# Patient Record
Sex: MALE | Race: WHITE | Employment: OTHER | URBAN - METROPOLITAN AREA
[De-identification: names, ages, dates, MRNs, and addresses within clinical notes are randomized per-mention and may not be internally consistent; named-entity substitution may affect disease eponyms.]

---

## 2020-05-11 ENCOUNTER — HOSPITAL ENCOUNTER (EMERGENCY)
Age: 70
Discharge: HOME | End: 2020-05-11
Attending: EMERGENCY MEDICINE
Payer: MEDICARE

## 2020-05-11 ENCOUNTER — APPOINTMENT (OUTPATIENT)
Dept: CT IMAGING | Age: 70
End: 2020-05-11
Payer: MEDICARE

## 2020-05-11 ENCOUNTER — APPOINTMENT (OUTPATIENT)
Dept: GENERAL RADIOLOGY | Age: 70
End: 2020-05-11
Payer: MEDICARE

## 2020-05-11 VITALS
SYSTOLIC BLOOD PRESSURE: 135 MMHG | HEART RATE: 101 BPM | DIASTOLIC BLOOD PRESSURE: 83 MMHG | HEIGHT: 68 IN | BODY MASS INDEX: 25.76 KG/M2 | WEIGHT: 170 LBS | TEMPERATURE: 98.9 F | RESPIRATION RATE: 18 BRPM | OXYGEN SATURATION: 93 %

## 2020-05-11 LAB
ADENOVIRUS BY PCR: NOT DETECTED
ALBUMIN SERPL-MCNC: 4.1 G/DL (ref 3.5–5.2)
ALP BLD-CCNC: 123 U/L (ref 40–129)
ALT SERPL-CCNC: 24 U/L (ref 0–40)
ANION GAP SERPL CALCULATED.3IONS-SCNC: 15 MMOL/L (ref 7–16)
APTT: 31.1 SEC (ref 24.5–35.1)
AST SERPL-CCNC: 21 U/L (ref 0–39)
BASOPHILS ABSOLUTE: 0.04 E9/L (ref 0–0.2)
BASOPHILS RELATIVE PERCENT: 0.3 % (ref 0–2)
BILIRUB SERPL-MCNC: 0.5 MG/DL (ref 0–1.2)
BORDETELLA PARAPERTUSSIS BY PCR: NOT DETECTED
BORDETELLA PERTUSSIS BY PCR: NOT DETECTED
BUN BLDV-MCNC: 20 MG/DL (ref 8–23)
CALCIUM SERPL-MCNC: 9.7 MG/DL (ref 8.6–10.2)
CHLAMYDOPHILIA PNEUMONIAE BY PCR: NOT DETECTED
CHLORIDE BLD-SCNC: 97 MMOL/L (ref 98–107)
CO2: 24 MMOL/L (ref 22–29)
CORONAVIRUS 229E BY PCR: NOT DETECTED
CORONAVIRUS HKU1 BY PCR: NOT DETECTED
CORONAVIRUS NL63 BY PCR: NOT DETECTED
CORONAVIRUS OC43 BY PCR: NOT DETECTED
CREAT SERPL-MCNC: 1.2 MG/DL (ref 0.7–1.2)
D DIMER: 386 NG/ML DDU
EKG ATRIAL RATE: 127 BPM
EKG P AXIS: 57 DEGREES
EKG P-R INTERVAL: 148 MS
EKG Q-T INTERVAL: 300 MS
EKG QRS DURATION: 84 MS
EKG QTC CALCULATION (BAZETT): 436 MS
EKG R AXIS: 50 DEGREES
EKG T AXIS: 75 DEGREES
EKG VENTRICULAR RATE: 127 BPM
EOSINOPHILS ABSOLUTE: 0.05 E9/L (ref 0.05–0.5)
EOSINOPHILS RELATIVE PERCENT: 0.4 % (ref 0–6)
GFR AFRICAN AMERICAN: >60
GFR NON-AFRICAN AMERICAN: 60 ML/MIN/1.73
GLUCOSE BLD-MCNC: 189 MG/DL (ref 74–99)
HCT VFR BLD CALC: 45.7 % (ref 37–54)
HEMOGLOBIN: 15.4 G/DL (ref 12.5–16.5)
HUMAN METAPNEUMOVIRUS BY PCR: NOT DETECTED
HUMAN RHINOVIRUS/ENTEROVIRUS BY PCR: NOT DETECTED
IMMATURE GRANULOCYTES #: 0.06 E9/L
IMMATURE GRANULOCYTES %: 0.5 % (ref 0–5)
INFLUENZA A BY PCR: NOT DETECTED
INFLUENZA B BY PCR: NOT DETECTED
INR BLD: 1.1
LACTIC ACID, SEPSIS: 1.2 MMOL/L (ref 0.5–1.9)
LYMPHOCYTES ABSOLUTE: 1.34 E9/L (ref 1.5–4)
LYMPHOCYTES RELATIVE PERCENT: 10.3 % (ref 20–42)
MCH RBC QN AUTO: 29 PG (ref 26–35)
MCHC RBC AUTO-ENTMCNC: 33.7 % (ref 32–34.5)
MCV RBC AUTO: 86.1 FL (ref 80–99.9)
MONOCYTES ABSOLUTE: 1.27 E9/L (ref 0.1–0.95)
MONOCYTES RELATIVE PERCENT: 9.7 % (ref 2–12)
MYCOPLASMA PNEUMONIAE BY PCR: NOT DETECTED
NEUTROPHILS ABSOLUTE: 10.29 E9/L (ref 1.8–7.3)
NEUTROPHILS RELATIVE PERCENT: 78.8 % (ref 43–80)
PARAINFLUENZA VIRUS 1 BY PCR: NOT DETECTED
PARAINFLUENZA VIRUS 2 BY PCR: NOT DETECTED
PARAINFLUENZA VIRUS 3 BY PCR: NOT DETECTED
PARAINFLUENZA VIRUS 4 BY PCR: NOT DETECTED
PDW BLD-RTO: 14.3 FL (ref 11.5–15)
PLATELET # BLD: 308 E9/L (ref 130–450)
PMV BLD AUTO: 9.9 FL (ref 7–12)
POTASSIUM REFLEX MAGNESIUM: 4.2 MMOL/L (ref 3.5–5)
PROTHROMBIN TIME: 12.9 SEC (ref 9.3–12.4)
RBC # BLD: 5.31 E12/L (ref 3.8–5.8)
RESPIRATORY SYNCYTIAL VIRUS BY PCR: NOT DETECTED
SARS-COV-2, NAAT: NOT DETECTED
SODIUM BLD-SCNC: 136 MMOL/L (ref 132–146)
TOTAL PROTEIN: 8.2 G/DL (ref 6.4–8.3)
TROPONIN: <0.01 NG/ML (ref 0–0.03)
WBC # BLD: 13.1 E9/L (ref 4.5–11.5)

## 2020-05-11 PROCEDURE — 6360000002 HC RX W HCPCS: Performed by: PHYSICIAN ASSISTANT

## 2020-05-11 PROCEDURE — 85610 PROTHROMBIN TIME: CPT

## 2020-05-11 PROCEDURE — 85025 COMPLETE CBC W/AUTO DIFF WBC: CPT

## 2020-05-11 PROCEDURE — 96374 THER/PROPH/DIAG INJ IV PUSH: CPT

## 2020-05-11 PROCEDURE — 2580000003 HC RX 258: Performed by: PHYSICIAN ASSISTANT

## 2020-05-11 PROCEDURE — 6360000004 HC RX CONTRAST MEDICATION: Performed by: RADIOLOGY

## 2020-05-11 PROCEDURE — 85378 FIBRIN DEGRADE SEMIQUANT: CPT

## 2020-05-11 PROCEDURE — 84484 ASSAY OF TROPONIN QUANT: CPT

## 2020-05-11 PROCEDURE — 0100U HC RESPIRPTHGN MULT REV TRANS & AMP PRB TECH 21 TRGT: CPT

## 2020-05-11 PROCEDURE — 93010 ELECTROCARDIOGRAM REPORT: CPT | Performed by: INTERNAL MEDICINE

## 2020-05-11 PROCEDURE — 83605 ASSAY OF LACTIC ACID: CPT

## 2020-05-11 PROCEDURE — 99285 EMERGENCY DEPT VISIT HI MDM: CPT

## 2020-05-11 PROCEDURE — 93005 ELECTROCARDIOGRAM TRACING: CPT | Performed by: PHYSICIAN ASSISTANT

## 2020-05-11 PROCEDURE — 87040 BLOOD CULTURE FOR BACTERIA: CPT

## 2020-05-11 PROCEDURE — U0002 COVID-19 LAB TEST NON-CDC: HCPCS

## 2020-05-11 PROCEDURE — 85730 THROMBOPLASTIN TIME PARTIAL: CPT

## 2020-05-11 PROCEDURE — 71045 X-RAY EXAM CHEST 1 VIEW: CPT

## 2020-05-11 PROCEDURE — 80053 COMPREHEN METABOLIC PANEL: CPT

## 2020-05-11 PROCEDURE — 6370000000 HC RX 637 (ALT 250 FOR IP): Performed by: PHYSICIAN ASSISTANT

## 2020-05-11 PROCEDURE — 71250 CT THORAX DX C-: CPT

## 2020-05-11 PROCEDURE — 71275 CT ANGIOGRAPHY CHEST: CPT

## 2020-05-11 RX ORDER — ALBUTEROL SULFATE 90 UG/1
2 AEROSOL, METERED RESPIRATORY (INHALATION) ONCE
Status: DISCONTINUED | OUTPATIENT
Start: 2020-05-11 | End: 2020-05-11 | Stop reason: HOSPADM

## 2020-05-11 RX ORDER — 0.9 % SODIUM CHLORIDE 0.9 %
1000 INTRAVENOUS SOLUTION INTRAVENOUS ONCE
Status: COMPLETED | OUTPATIENT
Start: 2020-05-11 | End: 2020-05-11

## 2020-05-11 RX ORDER — PREDNISONE 10 MG/1
40 TABLET ORAL DAILY
Qty: 20 TABLET | Refills: 0 | Status: SHIPPED | OUTPATIENT
Start: 2020-05-11 | End: 2020-05-16

## 2020-05-11 RX ORDER — DOXYCYCLINE HYCLATE 100 MG/1
100 CAPSULE ORAL ONCE
Status: COMPLETED | OUTPATIENT
Start: 2020-05-11 | End: 2020-05-11

## 2020-05-11 RX ORDER — GUAIFENESIN AND CODEINE PHOSPHATE 100; 10 MG/5ML; MG/5ML
5 SOLUTION ORAL 3 TIMES DAILY PRN
Qty: 45 ML | Refills: 0 | Status: SHIPPED | OUTPATIENT
Start: 2020-05-11 | End: 2020-05-14

## 2020-05-11 RX ORDER — ACETAMINOPHEN 325 MG/1
650 TABLET ORAL ONCE
Status: COMPLETED | OUTPATIENT
Start: 2020-05-11 | End: 2020-05-11

## 2020-05-11 RX ORDER — DOXYCYCLINE HYCLATE 100 MG
100 TABLET ORAL 2 TIMES DAILY
Qty: 20 TABLET | Refills: 0 | Status: SHIPPED | OUTPATIENT
Start: 2020-05-11 | End: 2020-05-21

## 2020-05-11 RX ORDER — METHYLPREDNISOLONE SODIUM SUCCINATE 125 MG/2ML
80 INJECTION, POWDER, LYOPHILIZED, FOR SOLUTION INTRAMUSCULAR; INTRAVENOUS ONCE
Status: COMPLETED | OUTPATIENT
Start: 2020-05-11 | End: 2020-05-11

## 2020-05-11 RX ORDER — SODIUM CHLORIDE 0.9 % (FLUSH) 0.9 %
10 SYRINGE (ML) INJECTION ONCE
Status: DISCONTINUED | OUTPATIENT
Start: 2020-05-11 | End: 2020-05-11 | Stop reason: HOSPADM

## 2020-05-11 RX ORDER — ALBUTEROL SULFATE 2.5 MG/3ML
2.5 SOLUTION RESPIRATORY (INHALATION) EVERY 6 HOURS PRN
Qty: 120 EACH | Refills: 3 | Status: SHIPPED | OUTPATIENT
Start: 2020-05-11

## 2020-05-11 RX ADMIN — IOPAMIDOL 70 ML: 755 INJECTION, SOLUTION INTRAVENOUS at 04:07

## 2020-05-11 RX ADMIN — ACETAMINOPHEN 650 MG: 325 TABLET ORAL at 01:11

## 2020-05-11 RX ADMIN — METHYLPREDNISOLONE SODIUM SUCCINATE 80 MG: 125 INJECTION, POWDER, FOR SOLUTION INTRAMUSCULAR; INTRAVENOUS at 04:29

## 2020-05-11 RX ADMIN — SODIUM CHLORIDE 1000 ML: 9 INJECTION, SOLUTION INTRAVENOUS at 01:05

## 2020-05-11 RX ADMIN — DOXYCYCLINE HYCLATE 100 MG: 100 CAPSULE ORAL at 04:29

## 2020-05-11 ASSESSMENT — PAIN SCALES - GENERAL
PAINLEVEL_OUTOF10: 0
PAINLEVEL_OUTOF10: 0

## 2020-05-11 NOTE — ED PROVIDER NOTES
QRS Duration 84 ms    Q-T Interval 300 ms    QTc Calculation (Bazett) 436 ms    P Axis 57 degrees    R Axis 50 degrees    T Axis 75 degrees     Imaging: All Radiology results interpreted by Radiologist unless otherwise noted. CTA PULMONARY W CONTRAST   Final Result   No PE or aortic dissection. Mild airways disease and atelectasis are noted. No evidence of pneumonia or pulmonary edema. This report has been electronically signed by Tamara Matias MD.      XR CHEST 1 VW   Final Result   No acute cardiopulmonary process. CT Chest WO Contrast   Final Result   1. There is no pattern for emphysematous changes in the lung   parenchyma. 2. Cannot exclude a component of discrete bronchitis/bronchiolitis to   mid/distal subsegmental branches of the left lower lobe and more   discretely in the right lower lobe and lingula. 3. Discrete areas of peripheral increased density superiorly spaces in   the posterior medial aspect of the left lower lobe and more discrete   in the right lower lobe are of undetermined age. They can represent   residual scar/peripheral atelectasis, although discrete/early   developing pneumonic process cannot be entirely excluded in the   absence of previous studies for comparison. Can consider follow-up   study. 4. Discrete nodular component seen in the lingula could also be   manifestation of distal airway disease as seen with more distal   bronchiolitis. EKG #1:  Interpreted by emergency department physician unless otherwise noted. Time:  0058    Rate: 127  Rhythm: Sinus. Interpretation: sinus tachycardia.     ED Course / Medical Decision Making     Medications   0.9 % sodium chloride bolus (0 mLs Intravenous Stopped 5/11/20 0148)   acetaminophen (TYLENOL) tablet 650 mg (650 mg Oral Given 5/11/20 0111)   iopamidol (ISOVUE-370) 76 % injection 70 mL (70 mLs Intravenous Given 5/11/20 0407)   methylPREDNISolone sodium (SOLU-MEDROL) injection 80 mg (80 mg Intravenous Given 5/11/20 0429)   doxycycline hyclate (VIBRAMYCIN) capsule 100 mg (100 mg Oral Given 5/11/20 0429)        Re-Evaluations:  5/11/20      Time: 0130  Pt is comfortable, oxygen 96% on 2 L nc. Pt ambulated 91% on no oxygen. Consultations:             None    Procedures:   none    MDM:  Pt presents from home with increasing shortness of breath and now developing fevers, reporting he feels like he has the flu, with diffuse body aches, no focal chest pain. Sinus tachy in ED with diminished breath sounds and diffuse wheezing noted. CT chest reports bronchiolitis, no ground glass opacities or focal lobar pna. Pt temp[erature reduced with tylenol. Cta negative for PE. Internal medicine contacted and felt pt could be discharged if he was comfortable with that, Dr. Mickey Black discussed with patient and this was his preference. Pt was stable at discharge, home with steroids, doxycycline, albuterol, advised to return if worsening. Counseling:   I have spoken with the patient and discussed todays results, in addition to providing specific details for the plan of care and counseling regarding the diagnosis and prognosis and are agreeable with the plan. Assessment      1. Upper respiratory tract infection, unspecified type    2. Acute bronchitis, unspecified organism      This patient's ED course included: a personal history and physicial examination, multiple bedside re-evaluations, IV medications, cardiac monitoring and continuous pulse oximetry  This patient has remained hemodynamically stable and improved during their ED course. Plan   Discharge to home. Patient condition is stable.     New Medications     Discharge Medication List as of 5/11/2020  4:27 AM      START taking these medications    Details   predniSONE (DELTASONE) 10 MG tablet Take 4 tablets by mouth daily for 5 days, Disp-20 tablet, R-0Print      doxycycline hyclate (VIBRA-TABS) 100 MG tablet Take 1 tablet by mouth 2 times daily for 10

## 2020-05-12 ENCOUNTER — CARE COORDINATION (OUTPATIENT)
Dept: CARE COORDINATION | Age: 70
End: 2020-05-12

## 2020-05-12 NOTE — CARE COORDINATION
tabletops, doorknobs, bathroom fixtures, toilets, phones, keyboards, tablets, and bedside tables. Also, clean any surfaces that may have blood, stool, or body fluids on them. Use a household cleaning spray or wipe, according to the label instructions. Labels contain instructions for safe and effective use of the cleaning product including precautions you should take when applying the product, such as wearing gloves and making sure you have good ventilation during use of the product. Monitor your symptoms  Seek prompt medical attention if your illness is worsening (e.g., difficulty breathing). Before seeking care, call your healthcare provider and tell them that you have, or are being evaluated for, COVID-19. Put on a facemask before you enter the facility. These steps will help the healthcare provider's office to keep other people in the office or waiting room from getting infected or exposed. Ask your healthcare provider to call the local or [facility] health department. Persons who are placed under If you have a medical emergency and need to call 911, notify the dispatch personnel that you have, or are being evaluated for COVID-19. If possible, put on a facemask before emergency medical services arrive. The patient agrees to contact the [facility] exposure line [phone], local health department [place] Department of Health: ([phone]) and PCP office for questions related to their healthcare. Author provided contact information for future reference. Patient/family/caregiver given information for [employer] and agrees to enroll no    Based on Loop alert triggers, patient will be contacted by nurse care manager for worsening symptoms.

## 2020-05-16 LAB
BLOOD CULTURE, ROUTINE: NORMAL
CULTURE, BLOOD 2: NORMAL

## 2020-05-26 ENCOUNTER — CARE COORDINATION (OUTPATIENT)
Dept: CARE COORDINATION | Age: 70
End: 2020-05-26

## 2020-05-26 NOTE — CARE COORDINATION
Patient contacted regarding COVID-19 risk and screening. This Km Sharif attempted to contact  the patient  for final follow up by telephone to perform follow-up call. Left message. Will no longer make and outreach.

## 2021-04-04 ENCOUNTER — HOSPITAL ENCOUNTER (EMERGENCY)
Age: 71
Discharge: HOME | End: 2021-04-04
Payer: MEDICARE

## 2021-04-04 ENCOUNTER — APPOINTMENT (OUTPATIENT)
Dept: GENERAL RADIOLOGY | Age: 71
End: 2021-04-04
Payer: MEDICARE

## 2021-04-04 VITALS
OXYGEN SATURATION: 94 % | RESPIRATION RATE: 16 BRPM | HEART RATE: 90 BPM | WEIGHT: 170 LBS | SYSTOLIC BLOOD PRESSURE: 116 MMHG | BODY MASS INDEX: 25.76 KG/M2 | HEIGHT: 68 IN | TEMPERATURE: 96.9 F | DIASTOLIC BLOOD PRESSURE: 70 MMHG

## 2021-04-04 DIAGNOSIS — M25.561 ACUTE PAIN OF RIGHT KNEE: Primary | ICD-10-CM

## 2021-04-04 LAB — URIC ACID, SERUM: 7.1 MG/DL (ref 3.4–7)

## 2021-04-04 PROCEDURE — 36415 COLL VENOUS BLD VENIPUNCTURE: CPT

## 2021-04-04 PROCEDURE — 99211 OFF/OP EST MAY X REQ PHY/QHP: CPT

## 2021-04-04 PROCEDURE — 84550 ASSAY OF BLOOD/URIC ACID: CPT

## 2021-04-04 PROCEDURE — 73560 X-RAY EXAM OF KNEE 1 OR 2: CPT

## 2021-04-04 RX ORDER — CEPHALEXIN 500 MG/1
500 CAPSULE ORAL 2 TIMES DAILY
Qty: 14 CAPSULE | Refills: 0 | Status: SHIPPED | OUTPATIENT
Start: 2021-04-04 | End: 2021-04-11

## 2021-04-04 ASSESSMENT — PAIN - FUNCTIONAL ASSESSMENT
PAIN_FUNCTIONAL_ASSESSMENT: PREVENTS OR INTERFERES SOME ACTIVE ACTIVITIES AND ADLS
PAIN_FUNCTIONAL_ASSESSMENT: PREVENTS OR INTERFERES SOME ACTIVE ACTIVITIES AND ADLS

## 2021-04-04 ASSESSMENT — PAIN DESCRIPTION - PAIN TYPE
TYPE: ACUTE PAIN
TYPE: ACUTE PAIN

## 2021-04-04 ASSESSMENT — PAIN DESCRIPTION - ONSET
ONSET: ON-GOING
ONSET: ON-GOING

## 2021-04-04 ASSESSMENT — PAIN DESCRIPTION - ORIENTATION: ORIENTATION: RIGHT

## 2021-04-04 ASSESSMENT — PAIN DESCRIPTION - LOCATION: LOCATION: KNEE

## 2021-04-04 ASSESSMENT — PAIN SCALES - GENERAL: PAINLEVEL_OUTOF10: 5

## 2021-04-04 ASSESSMENT — PAIN DESCRIPTION - PROGRESSION: CLINICAL_PROGRESSION: NOT CHANGED

## 2021-04-04 NOTE — ED PROVIDER NOTES
[facility]  Department of Emergency Medicine  UC Encounter Note  21   12:56 PM EDT      NAME: Maki Hernández  :  1950  MRN:  [id number]    Chief Complaint: Knee Pain (Right)      This is a 27-year-old male the presents to urgent care with family. He has been having some right anterior knee redness swelling and warmth for the past several days. He denies injury. Family states actually the knee was more swollen and red yesterday and has improved today. Has been taking some over-the-counter medications for this. He denies any chest pain or shortness of breath. I first contact patient he appears to be in no acute distress. Review of Systems  Pertinent positives and negatives are stated within HPI, all other systems reviewed and are negative. Physical Exam  Vitals signs and nursing note reviewed. Constitutional:       Appearance: He is well-developed. HENT:      Head: Normocephalic and atraumatic. Jaw: No trismus. Right Ear: Hearing, tympanic membrane, ear canal and external ear normal.      Left Ear: Hearing, tympanic membrane, ear canal and external ear normal.      Nose: Nose normal.      Right Sinus: No maxillary sinus tenderness or frontal sinus tenderness. Left Sinus: No maxillary sinus tenderness or frontal sinus tenderness. Mouth/Throat:      Pharynx: Uvula midline. No uvula swelling. Eyes:      General: Lids are normal.      Conjunctiva/sclera: Conjunctivae normal.      Pupils: Pupils are equal, round, and reactive to light. Neck:      Musculoskeletal: Normal range of motion and neck supple. Cardiovascular:      Rate and Rhythm: Normal rate and regular rhythm. Heart sounds: Normal heart sounds. No murmur. Pulmonary:      Effort: Pulmonary effort is normal.      Breath sounds: Normal breath sounds. Abdominal:      General: Bowel sounds are normal.      Palpations: Abdomen is soft. Abdomen is not rigid. Hyperlipidemia, and Hypertension. Past Surgical History:  has a past surgical history that includes doppler echocardiography. Social History:  reports that he has been smoking cigarettes. He has been smoking about 0.25 packs per day. He has never used smokeless tobacco. He reports current alcohol use. He reports that he does not use drugs. Family History: family history is not on file. The patients home medications have been reviewed. Allergies: Patient has no known allergies. -------------------------------------------------- RESULTS -------------------------------------------------  No results found for this visit on 04/04/21. XR KNEE RIGHT (1-2 VIEWS)   Final Result   No acute abnormality of the knee.             ------------------------- NURSING NOTES AND VITALS REVIEWED ---------------------------   The nursing notes within the ED encounter and vital signs as below have been reviewed. /70   Pulse 90   Temp 96.9 °F (36.1 °C) (Temporal)   Resp 16   Ht 5' 8\" (1.727 m)   Wt 170 lb (77.1 kg)   SpO2 94%   BMI 25.85 kg/m²   Oxygen Saturation Interpretation: Normal      ------------------------------------------ PROGRESS NOTES ------------------------------------------   I have spoken with the patient and discussed todays results, in addition to providing specific details for the plan of care and counseling regarding the diagnosis and prognosis. Their questions are answered at this time and they are agreeable with the plan.      --------------------------------- ADDITIONAL PROVIDER NOTES ---------------------------------     This patient is stable for discharge. I have shared the specific conditions for return, as well as the importance of follow-up. * NOTE: This report was transcribed using voice recognition software.  Every effort was made to ensure accuracy; however, inadvertent computerized transcription errors may be present.    --------------------------------- IMPRESSION AND DISPOSITION ---------------------------------    IMPRESSION  1.  Acute pain of right knee        DISPOSITION  Disposition: Discharge to home  Patient condition is good       Jasen Barron PA-C  04/04/21 [id number]

## 2022-05-24 ENCOUNTER — NURSE ONLY (OUTPATIENT)
Dept: NON INVASIVE DIAGNOSTICS | Age: 72
End: 2022-05-24

## 2022-05-24 VITALS — SYSTOLIC BLOOD PRESSURE: 128 MMHG | DIASTOLIC BLOOD PRESSURE: 62 MMHG

## 2022-05-24 DIAGNOSIS — I42.9 CARDIOMYOPATHY, UNSPECIFIED TYPE (HCC): Primary | ICD-10-CM

## 2022-05-24 NOTE — PROGRESS NOTES
History:  The patient is a gentleman with the following issues:    1. Hypertension and diabetes. 2. Severe left ventricular dysfunction with normal coronaries by cardiac catheterization in the [facility] in 2015.  3. Appropriate medical therapy with repeat MUGA scan in September 2015 revealing left ventricular ejection fraction of 28%. 4. History of alcohol use. 5. Sleep apnea possibly. The patient has been diagnosed but does not use his continuous positive airway pressure regularly. 6. Ex-smoker. 7. Mild renal insufficiency. 8. Repeat echocardiogram obtained on March 30th on appropriate medical therapy again shows a severely reduced left ventricular systolic function, left ventricular ejection fraction of 25% to 30%. 9. Implantation of a [employer], [place] [address]117 on 5-23-16. MEDICATIONS: Albuterol sulfate, Aleve, Aripiprazole 5 mg 1/2 tab QD, Aspirin 81 mg QD, Atorvastatin, CoQ10 200 mg QD, Furosemide 20 mg QD, Gabapentin, metformin, Metoprolol Tart 100 mg BID, Multivitamin, Omeprazole 20 mg QD, Tamsulosin, Venlafaxine 75 mg BID, Stiolto Respimat, Fish oil    Uses CPAP regularly**Vaccinated + Booster         Emblem S-ICD :    Battery:  30 % Good    Episodes:  No arrhythmias detected. Presenting rhythm strip shows sinus rhythm rate 67. ASSESSMENT:  Appropriate ICD function. Plan:    1.  SQ ICD follow-up in 6 months. 2.  Latitude transmission in 3 months. 3.  Pt is aware that continuous home monitoring is strongly recommended. Farshad Gonzalez M.D., F.A.C.C.

## 2023-05-22 ENCOUNTER — HOSPITAL ENCOUNTER (OUTPATIENT)
Age: 73
Discharge: HOME | End: 2023-05-22
Payer: MEDICARE

## 2023-05-22 LAB
BUN SERPL-MCNC: 20 MG/DL (ref 6–23)
CREAT SERPL-MCNC: 1 MG/DL (ref 0.7–1.2)

## 2023-05-22 PROCEDURE — 36415 COLL VENOUS BLD VENIPUNCTURE: CPT

## 2023-05-22 PROCEDURE — 82565 ASSAY OF CREATININE: CPT

## 2023-05-22 PROCEDURE — 84520 ASSAY OF UREA NITROGEN: CPT

## 2023-05-23 ENCOUNTER — HOSPITAL ENCOUNTER (OUTPATIENT)
Dept: CT IMAGING | Age: 73
Discharge: HOME | End: 2023-05-25
Payer: MEDICARE

## 2023-05-23 DIAGNOSIS — R05.3 CHRONIC COUGH: ICD-10-CM

## 2023-05-23 PROCEDURE — 6360000004 HC RX CONTRAST MEDICATION: Performed by: RADIOLOGY

## 2023-05-23 PROCEDURE — 71260 CT THORAX DX C+: CPT

## 2023-05-23 RX ADMIN — IOPAMIDOL 75 ML: 755 INJECTION, SOLUTION INTRAVENOUS at 15:53

## 2023-10-18 ENCOUNTER — HOSPITAL ENCOUNTER (OUTPATIENT)
Dept: CT IMAGING | Age: 73
Discharge: HOME | End: 2023-10-20
Payer: MEDICARE

## 2023-10-18 DIAGNOSIS — R91.8 OTHER NONSPECIFIC ABNORMAL FINDING OF LUNG FIELD: ICD-10-CM

## 2023-10-18 PROCEDURE — 71250 CT THORAX DX C-: CPT

## 2023-11-02 ENCOUNTER — OFFICE VISIT (OUTPATIENT)
Dept: NON INVASIVE DIAGNOSTICS | Age: 73
End: 2023-11-02

## 2023-11-02 ENCOUNTER — NURSE ONLY (OUTPATIENT)
Dept: NON INVASIVE DIAGNOSTICS | Age: 73
End: 2023-11-02

## 2023-11-02 VITALS
RESPIRATION RATE: 16 BRPM | DIASTOLIC BLOOD PRESSURE: 58 MMHG | BODY MASS INDEX: 25.01 KG/M2 | HEIGHT: 68 IN | WEIGHT: 165 LBS | HEART RATE: 83 BPM | SYSTOLIC BLOOD PRESSURE: 102 MMHG

## 2023-11-02 DIAGNOSIS — I51.9 HEART PROBLEM: Primary | ICD-10-CM

## 2023-11-02 DIAGNOSIS — I42.9 CARDIOMYOPATHY, UNSPECIFIED TYPE (HCC): Primary | ICD-10-CM

## 2023-11-02 DIAGNOSIS — E13.9 DIABETES 1.5, MANAGED AS TYPE 2 (HCC): ICD-10-CM

## 2023-11-02 DIAGNOSIS — Z95.810 PRESENCE OF AUTOMATIC CARDIOVERTER/DEFIBRILLATOR (AICD): ICD-10-CM

## 2023-11-02 PROBLEM — F43.12 POST-TRAUMATIC STRESS DISORDER, CHRONIC: Status: ACTIVE | Noted: 2023-11-02

## 2023-11-02 PROBLEM — N40.0 BENIGN PROSTATIC HYPERPLASIA: Status: ACTIVE | Noted: 2023-11-02

## 2023-11-02 PROBLEM — R06.09 CHRONIC DYSPNEA: Status: ACTIVE | Noted: 2023-04-17

## 2023-11-02 PROBLEM — F31.9 BIPOLAR DISORDER (HCC): Status: ACTIVE | Noted: 2018-04-13

## 2023-11-02 PROBLEM — J44.9 COPD (CHRONIC OBSTRUCTIVE PULMONARY DISEASE) (HCC): Status: ACTIVE | Noted: 2023-11-02

## 2023-11-02 PROBLEM — J02.9 ACUTE PHARYNGITIS: Status: ACTIVE | Noted: 2023-11-02

## 2023-11-02 PROBLEM — J44.1 ACUTE EXACERBATION OF CHRONIC OBSTRUCTIVE AIRWAYS DISEASE (HCC): Status: ACTIVE | Noted: 2019-02-23

## 2023-11-02 PROBLEM — F17.210 CIGARETTE SMOKER: Status: ACTIVE | Noted: 2023-04-17

## 2023-11-02 PROBLEM — E08.610 DIABETES MELLITUS DUE TO UNDERLYING CONDITION WITH DIABETIC NEUROPATHIC ARTHROPATHY (HCC): Status: ACTIVE | Noted: 2023-11-02

## 2023-11-02 PROBLEM — J44.9 CHRONIC OBSTRUCTIVE PULMONARY DISEASE, UNSPECIFIED (HCC): Status: ACTIVE | Noted: 2023-11-02

## 2023-11-02 PROBLEM — G47.36 HYPOXEMIA ASSOCIATED WITH SLEEP: Status: ACTIVE | Noted: 2023-04-17

## 2023-11-02 PROBLEM — J44.9 SEVERE CHRONIC OBSTRUCTIVE PULMONARY DISEASE (HCC): Status: ACTIVE | Noted: 2023-04-17

## 2023-11-02 PROBLEM — R05.3 CHRONIC COUGH: Status: ACTIVE | Noted: 2023-04-17

## 2023-11-02 PROBLEM — E11.40 DIABETIC NEUROPATHY (HCC): Status: ACTIVE | Noted: 2023-11-02

## 2023-11-02 PROBLEM — K57.30 DIVERTICULAR DISEASE OF COLON: Status: ACTIVE | Noted: 2023-11-02

## 2023-11-02 PROBLEM — G47.30 SLEEP APNEA: Status: ACTIVE | Noted: 2018-04-13

## 2023-11-02 PROBLEM — J96.11 CHRONIC HYPOXEMIC RESPIRATORY FAILURE (HCC): Status: ACTIVE | Noted: 2019-02-26

## 2023-11-02 PROBLEM — I50.9 CONGESTIVE HEART FAILURE (HCC): Status: ACTIVE | Noted: 2018-04-13

## 2023-11-02 PROBLEM — J44.9 CHRONIC OBSTRUCTIVE LUNG DISEASE (HCC): Status: ACTIVE | Noted: 2018-04-13

## 2023-11-02 RX ORDER — MULTIVITAMIN
TABLET ORAL
COMMUNITY

## 2023-11-02 RX ORDER — METOPROLOL TARTRATE 100 MG/1
100 TABLET ORAL 2 TIMES DAILY
COMMUNITY

## 2023-11-02 RX ORDER — ATORVASTATIN CALCIUM 80 MG/1
80 TABLET, FILM COATED ORAL DAILY
COMMUNITY
Start: 2022-05-04

## 2023-11-02 NOTE — PROGRESS NOTES
Resp: 16   Weight: 74.8 kg (165 lb)   Height: 1.727 m (5' 8\")     Constitutional: Oriented to person, place, and time. Well-developed and cooperative. Head: Normocephalic and atraumatic. Eyes: Conjunctivae are normal. Pupils are equal, round, and reactive to light. Neck: No hepatojugular reflux and no JVD present. Cardiovascular: Normally placed PMI, normal S1 and S2 with left sternal border and the apex, no gallops or murmurs noted  Pulmonary/Chest: Effort normal and breath sounds normal. No respiratory distress. Abdominal: Soft. Normal appearance and nondistended  Musculoskeletal: Normal range of motion of all extremities, no muscle weakness. Neurological: Alert and oriented to person, place, and time. Gait normal.   Skin: Skin is warm and dry. No bruising, no ecchymosis and no rash noted. Extremity: No clubbing or cyanosis. No edema. Psychiatric: Normal mood and affect.  Thought content normal.     Pertinent Labs:  CBC:   WBC   Date Value Ref Range Status   05/11/2020 13.1 (H) 4.5 - 11.5 E9/L Final     Hemoglobin   Date Value Ref Range Status   05/11/2020 15.4 12.5 - 16.5 g/dL Final     Hematocrit   Date Value Ref Range Status   05/11/2020 45.7 37.0 - 54.0 % Final     Platelets   Date Value Ref Range Status   05/11/2020 308 130 - 450 E9/L Final     BMP:   Lab Results   Component Value Date/Time     05/11/2020 01:02 AM    K 4.2 05/11/2020 01:02 AM    CL 97 05/11/2020 01:02 AM    CO2 24 05/11/2020 01:02 AM    BUN 20 05/22/2023 09:45 AM    CREATININE 1.0 05/22/2023 09:45 AM    GLUCOSE 189 05/11/2020 01:02 AM    CALCIUM 9.7 05/11/2020 01:02 AM      ABGs: No results found for: \"PHART\", \"PO2ART\", \"XHG2PSQ\"  INR:   Lab Results   Component Value Date    INR 1.1 05/11/2020     BNP: No results found for: \"BNP\"  TSH: No results found for: \"TSH\"   Cardiac Injury Profile:   Troponin   Date Value Ref Range Status   05/11/2020 <0.01 0.00 - 0.03 ng/mL Final     Comment:     TROPONIN T BLOOD LEVELS:

## 2023-11-07 DIAGNOSIS — J43.2 CENTRILOBULAR EMPHYSEMA (MULTI): ICD-10-CM

## 2023-11-21 ENCOUNTER — APPOINTMENT (OUTPATIENT)
Dept: RESPIRATORY THERAPY | Facility: HOSPITAL | Age: 73
End: 2023-11-21

## 2023-11-27 ENCOUNTER — APPOINTMENT (OUTPATIENT)
Dept: RESPIRATORY THERAPY | Facility: HOSPITAL | Age: 73
End: 2023-11-27
Payer: MEDICARE

## 2023-11-27 ENCOUNTER — HOSPITAL ENCOUNTER (OUTPATIENT)
Dept: RESPIRATORY THERAPY | Facility: HOSPITAL | Age: 73
End: 2023-11-27
Payer: MEDICARE

## 2024-01-04 ENCOUNTER — TELEPHONE (OUTPATIENT)
Dept: NON INVASIVE DIAGNOSTICS | Age: 74
End: 2024-01-04

## 2024-01-11 ENCOUNTER — HOSPITAL ENCOUNTER (OUTPATIENT)
Dept: RESPIRATORY THERAPY | Facility: HOSPITAL | Age: 74
Discharge: HOME | End: 2024-01-11
Payer: MEDICARE

## 2024-01-11 ENCOUNTER — HOSPITAL ENCOUNTER (OUTPATIENT)
Dept: RADIOLOGY | Facility: HOSPITAL | Age: 74
Discharge: HOME | End: 2024-01-11
Payer: MEDICARE

## 2024-01-11 DIAGNOSIS — J43.2 CENTRILOBULAR EMPHYSEMA (MULTI): ICD-10-CM

## 2024-01-11 LAB
BASE EXCESS BLDA CALC-SCNC: 0.8 MMOL/L (ref -2–3)
BODY TEMPERATURE: 37 DEGREES CELSIUS
COHGB MFR BLDA: 0.5 %
COHGB MFR BLDA: 1.7 %
DO-HGB MFR BLDA: 4.4 % (ref 0–5)
DO-HGB MFR BLDA: 4.7 % (ref 0–5)
HCO3 BLDA-SCNC: 25.3 MMOL/L (ref 22–26)
HGB BLDA-MCNC: 13 G/DL (ref 13.5–17.5)
HGB BLDA-MCNC: 13.4 G/DL (ref 13.5–17.5)
METHGB MFR BLDA: 0 % (ref 0–1.5)
METHGB MFR BLDA: 0 % (ref 0–1.5)
OXYHGB MFR BLDA: 93.9 % (ref 94–98)
OXYHGB MFR BLDA: 94.8 % (ref 94–98)
PCO2 BLDA: 39 MM HG (ref 38–42)
PH BLDA: 7.42 PH (ref 7.38–7.42)
PO2 BLDA: 73 MM HG (ref 85–95)
SAO2 % BLDA: 96 % (ref 94–100)

## 2024-01-11 PROCEDURE — 82375 ASSAY CARBOXYHB QUANT: CPT

## 2024-01-11 PROCEDURE — 94726 PLETHYSMOGRAPHY LUNG VOLUMES: CPT

## 2024-01-11 PROCEDURE — 71250 CT THORAX DX C-: CPT

## 2024-01-11 PROCEDURE — 71250 CT THORAX DX C-: CPT | Performed by: RADIOLOGY

## 2024-01-12 DIAGNOSIS — J43.2 CENTRILOBULAR EMPHYSEMA (MULTI): ICD-10-CM

## 2024-01-12 LAB
MGC ASCENT PFT - FEV1 - POST: 0.72
MGC ASCENT PFT - FEV1 - PRE: 0.59
MGC ASCENT PFT - FEV1 - PREDICTED: 2.55
MGC ASCENT PFT - FVC - POST: 2.93
MGC ASCENT PFT - FVC - PRE: 2.9
MGC ASCENT PFT - FVC - PREDICTED: 3.37

## 2024-01-15 DIAGNOSIS — J43.2 CENTRILOBULAR EMPHYSEMA (MULTI): ICD-10-CM

## 2024-01-15 DIAGNOSIS — R06.02 SHORTNESS OF BREATH ON EXERTION: ICD-10-CM

## 2024-01-23 ENCOUNTER — TELEPHONE (OUTPATIENT)
Dept: PULMONOLOGY | Facility: HOSPITAL | Age: 74
End: 2024-01-23
Payer: MEDICARE

## 2024-01-23 NOTE — TELEPHONE ENCOUNTER
I spoke with patient today as a F/U to BVLR testing. Patient states he has started OR and is calling to schedule EHCO today. He will call back with date of ECHO and I will set him up with visit with Dr. Vidal.

## 2024-02-06 ENCOUNTER — HOSPITAL ENCOUNTER (OUTPATIENT)
Dept: CARDIOLOGY | Facility: CLINIC | Age: 74
Discharge: HOME | End: 2024-02-06
Payer: MEDICARE

## 2024-02-06 DIAGNOSIS — R06.02 SHORTNESS OF BREATH ON EXERTION: ICD-10-CM

## 2024-02-06 DIAGNOSIS — J43.2 CENTRILOBULAR EMPHYSEMA (MULTI): ICD-10-CM

## 2024-02-06 PROCEDURE — 93306 TTE W/DOPPLER COMPLETE: CPT | Performed by: STUDENT IN AN ORGANIZED HEALTH CARE EDUCATION/TRAINING PROGRAM

## 2024-02-06 PROCEDURE — 93306 TTE W/DOPPLER COMPLETE: CPT

## 2024-02-09 LAB
AORTIC VALVE MEAN GRADIENT: 2 MMHG
AORTIC VALVE PEAK VELOCITY: 1.01 M/S
AV PEAK GRADIENT: 4.1 MMHG
AVA (PEAK VEL): 2.19 CM2
AVA (VTI): 1.98 CM2
EJECTION FRACTION APICAL 4 CHAMBER: 40.2
EJECTION FRACTION: 42 %
LEFT VENTRICULAR OUTFLOW TRACT DIAMETER: 2 CM
MITRAL VALVE E/A RATIO: 0.38
MITRAL VALVE E/E' RATIO: 5.56
RIGHT VENTRICLE FREE WALL PEAK S': 9.68 CM/S
TRICUSPID ANNULAR PLANE SYSTOLIC EXCURSION: 1.8 CM

## 2024-02-15 ENCOUNTER — TELEMEDICINE (OUTPATIENT)
Dept: PULMONOLOGY | Facility: CLINIC | Age: 74
End: 2024-02-15
Payer: MEDICARE

## 2024-02-15 DIAGNOSIS — J44.9: Primary | ICD-10-CM

## 2024-02-15 PROCEDURE — 99205 OFFICE O/P NEW HI 60 MIN: CPT | Performed by: INTERNAL MEDICINE

## 2024-02-15 NOTE — PROGRESS NOTES
Department of Medicine  Division of Pulmonary, Critical Care, and Sleep Medicine  Consultation  [facility]    I was asked to evaluate Lyle Trujillo for bronchoscopic lung volume reduction. I have independently interviewed and examined the patient in the office and reviewed available records. The finalized note is available in the electronic medical record for review by the referring physician.     Due to the COVID-19 pandemic, this clinical encounter was conducted via virtual portal or telephone (if the patient did not have access to the virtual portal or declined to use the virtual portal). Verbal consent was obtained. Name and birthday were used to confirm the patient's identity. I confirmed with the patient that they were in a private environment in which they could freely discuss their healthcare information. Patient location: [place]. I communicated my name and role, and if a new patient, conveyed that I have an active [place] medical license.       Physician HPI (2/19/2024):     73 y.o. male former smoker (quit in 2023, 90-pack-year history) with COPD (postbronchodilator FEV1 28% of predicted as of January 2024), SHARYN (on CPAP at bedtime), and HFrEF (with ICD place, latest EF is 35-40% in 02/2024) who has been referred to me for evaluation for bronchoscopic lung volume reduction.  He was seeing Dr. Block in [facility] for his pulmonary care, he has recently switched pulmonologist.  He is unsure of the name of the new pulmonologist.    He continues to have dyspnea with minimal exertion.Has been in pulm rehab for 3 weeks. Has PRN oxygen. Uses CPAP at night for SHARYN    Currently inhalers: albuterol PRN and Stiolto  Not on systemic prednisone  No other surgeries to chest wall or lungs, with the exception of a placement of a defibrillator  No history of PTX  No allergies to nickel, titanium, or nitinol  AECOPD last year : 0  PNA over the last year: 0  At present he is not interested in any surgical  options for his COPD  mmRC: 3  CAT: 15     Immunization History:  Immunization History   Administered Date(s) Administered    Pfizer COVID-19 vaccine, Fall 2023, 12 years and older, (30mcg/0.3mL) 09/28/2023    Pfizer COVID-19 vaccine, bivalent, age 12 years and older (30 mcg/0.3 mL) 11/02/2022    Pfizer Gray Cap SARS-CoV-2 07/23/2022    Pfizer Purple Cap SARS-CoV-2 02/18/2021, 03/11/2021, 09/30/2021       Family History:  No family history on file.    Social History:  Social History     Socioeconomic History    Marital status: Unknown     Spouse name: Not on file    Number of children: Not on file    Years of education: Not on file    Highest education level: Not on file   Occupational History    Not on file   Tobacco Use    Smoking status: Not on file    Smokeless tobacco: Not on file   Substance and Sexual Activity    Alcohol use: Not on file    Drug use: Not on file    Sexual activity: Not on file   Other Topics Concern    Not on file   Social History Narrative    Not on file     Social Determinants of Health     Financial Resource Strain: Not on file   Food Insecurity: Not on file   Transportation Needs: Not on file   Physical Activity: Not on file   Stress: Not on file   Social Connections: Not on file   Intimate Partner Violence: Not on file   Housing Stability: Not on file       Current Medications:  No current outpatient medications     Drug Allergies/Intolerances:  Not on File     Review of Systems:  Review of Systems     All other review of systems are negative and/or non-contributory.    Physical Examination:  Not applicable - telephone/virtual visit     Pulmonary Function Test Results     PFTs 01/2024: FEV1\FVC 0.20, FVC 20% of predicted, no bronchodilator response, % of predicted, % predicted, DLCO corrected 62% of predicted    6MWT 01/2024: Walked 288 m, 59.7% of predicted, shlomo index 4, used 2 L/min of supplemental oxygen during ambulation    Chest Radiograph     XR chest 1 view  2020    Narrative  Patient MRN:  [id number]  : 1950  Age: 70 years  Gender: Male    Order Date:  2020 1:00 AM        TECHNIQUE/NUMBER OF IMAGES/COMPARISON/CLINICAL HISTORY:    Chest AP    1 image one view.    Difficult breathing. History of COPD hypertension and hyperlipidemia.    FINDINGS: There is a mediastinal monitory device placed on the  left-sided. Monitory seen in the left infra axillary region.    Heart has normal size, lungs are clear, no infiltrates, consolidations  or pleural effusions.    There is no perihilar vascular congestion.    Impression  No acute cardiopulmonary process.      Echocardiogram     TTE 2024:   CONCLUSIONS:   1. Left ventricular systolic function is moderately decreased with a 35-40% estimated ejection fraction.   2. Poor endomyocardial wall border definition; recommend echo enhancing agent on future studies; there is hypokinesis of apical / lateral wall in addition to global LV hypokinesis; cannot exclude LV apical thrombus. Study limited by poor endomyocardial wall border definition.   3. Spectral Doppler shows an impaired relaxation pattern of left ventricular diastolic filling.   4. There is low normal right ventricular systolic function.       Chest CT Scan     CT chest 2024:   IMPRESSION:  1.  Extensive bronchial wall thickening and tree-in-bud nodularity  predominantly in the lower lobes, in keeping with airway disease with  active infection/bronchiolitis not excluded. Appearance of the lungs  appear grossly unchanged compared to prior CT from 2023.  2. Background emphysema with upper lung predominance.  3. Moderate coronary artery calcification. Correlation with coronary  artery disease risk factors.  4. Mild diffuse hepatic steatosis.  5. Other findings as described above        Latest AB.42/39/73   Latest GFR: unknown      Assessment and Plan / Recommendations:  Problem List Items Addressed This Visit    73 y.o. male former smoker (quit in  2023, 90-pack-year history) with COPD (postbronchodilator FEV1 28% of predicted as of January 2024), SHARYN (on CPAP at bedtime), and HFrEF (with ICD place, latest EF is 35-40% in 02/2024) who has been referred to me for evaluation for bronchoscopic lung volume reduction.  He was seeing Dr. Block in [facility] for his pulmonary care, he has recently switched pulmonologist.  He is unsure of the name of the new pulmonologist.        Pulmonary and Pneumonias    Very severe chronic obstructive pulmonary disease (CMS/HCC) - Primary    Current Assessment & Plan     - Has dyspnea with minimal exertion on maximal medical therapy, has enrolled in pulmonary rehab for last 3 weeks  -We discussed the overall procedure bronchoscopic lung volume reduction.  At present, he would like to receive some more supplemental information about this before he comes to a decision.  - PFTs 01/2024: FEV1\FVC 0.20, FVC 20% of predicted, no bronchodilator response, % of predicted, % predicted, DLCO corrected 62% of predicted  - 6MWT 01/2024: Walked 288 m, 59.7% of predicted, shlomo index 4, used 2 L/min of supplemental oxygen during ambulation  -   TTE 01/2024:   CONCLUSIONS:   1. Left ventricular systolic function is moderately decreased with a 35-40% estimated ejection fraction.   2. Poor endomyocardial wall border definition; recommend echo enhancing agent on future studies; there is hypokinesis of apical / lateral wall in addition to global LV hypokinesis; cannot exclude LV apical thrombus. Study limited by poor endomyocardial wall border definition.   3. Spectral Doppler shows an impaired relaxation pattern of left ventricular diastolic filling.   4. There is low normal right ventricular systolic function.  - CT chest 01/2024:   IMPRESSION:  1.  Extensive bronchial wall thickening and tree-in-bud nodularity  predominantly in the lower lobes, in keeping with airway disease with  active infection/bronchiolitis not excluded.  Appearance of the lungs  appear grossly unchanged compared to prior CT from 05/23/2023.  2. Background emphysema with upper lung predominance.  3. Moderate coronary artery calcification. Correlation with coronary  artery disease risk factors.  4. Mild diffuse hepatic steatosis.  5. Other findings as described above  - StratX report:      Plan: Today was the first time he heard about the actual steps of the valve procedure and wants additional information about this.  We will arrange for this to get sent over for the patient for him to review.  He does not follow the  system so I do not have his full medical history.  Based on his cardiac comorbidities, it is important confirm the severity of his cardiac comorbidities at this time.  His StratX report suggests multiple targets can be considered.  I recommend that he follow-up in clinic after we have acquired his latest outpatient cardiac note and full medication list.  This will give him time to review the information we have sent and have further discussions.             Follow-up: Visit date not found     Time on telephone with patient (patient was offered both telephone and live A/V option, he elected the telephone): 20 minutes    Parts of this note may have been generated using voice dictation software, Dragon.  Homophonic errors may exist.  Please contact me directly if clarification is needed.    Rodrigo Vidal MD  Staff Physician - Interventional Pulmonology  1:28 PM  02/19/24  Office number: [phone]  Fax number:  [phone]

## 2024-02-15 NOTE — Clinical Note
He's not seeing Block any more - can you send this note to who ever is seeing now. Also, he wanted some paper material about the valves, I did this virtually so had no way to send it to him. Are you able to work with Jesus to get him that info. Have him see my in clinic in 2-4 weeks during my regular clinic day, in person or virtual, but it'll be on the regular clinic day so the nurse I work with can do his med rec. Also have his latest outpatient cardiology note faxed into the system.

## 2024-02-19 PROBLEM — J44.9: Status: ACTIVE | Noted: 2024-02-19

## 2024-02-19 NOTE — ASSESSMENT & PLAN NOTE
- Has dyspnea with minimal exertion on maximal medical therapy, has enrolled in pulmonary rehab for last 3 weeks  -We discussed the overall procedure bronchoscopic lung volume reduction.  At present, he would like to receive some more supplemental information about this before he comes to a decision.  - PFTs 01/2024: FEV1\FVC 0.20, FVC 20% of predicted, no bronchodilator response, % of predicted, % predicted, DLCO corrected 62% of predicted  - 6MWT 01/2024: Walked 288 m, 59.7% of predicted, shlomo index 4, used 2 L/min of supplemental oxygen during ambulation  -   TTE 01/2024:   CONCLUSIONS:   1. Left ventricular systolic function is moderately decreased with a 35-40% estimated ejection fraction.   2. Poor endomyocardial wall border definition; recommend echo enhancing agent on future studies; there is hypokinesis of apical / lateral wall in addition to global LV hypokinesis; cannot exclude LV apical thrombus. Study limited by poor endomyocardial wall border definition.   3. Spectral Doppler shows an impaired relaxation pattern of left ventricular diastolic filling.   4. There is low normal right ventricular systolic function.  - CT chest 01/2024:   IMPRESSION:  1.  Extensive bronchial wall thickening and tree-in-bud nodularity  predominantly in the lower lobes, in keeping with airway disease with  active infection/bronchiolitis not excluded. Appearance of the lungs  appear grossly unchanged compared to prior CT from 05/23/2023.  2. Background emphysema with upper lung predominance.  3. Moderate coronary artery calcification. Correlation with coronary  artery disease risk factors.  4. Mild diffuse hepatic steatosis.  5. Other findings as described above  - StratX report:      Plan: Today was the first time he heard about the actual steps of the valve procedure and wants additional information about this.  We will arrange for this to get sent over for the patient for him to review.  He does not follow the   system so I do not have his full medical history.  Based on his cardiac comorbidities, it is important confirm the severity of his cardiac comorbidities at this time.  His StratX report suggests multiple targets can be considered.  I recommend that he follow-up in clinic after we have acquired his latest outpatient cardiac note and full medication list.  This will give him time to review the information we have sent and have further discussions.

## 2024-03-06 DIAGNOSIS — J45.50 SEVERE PERSISTENT ASTHMA, UNSPECIFIED WHETHER COMPLICATED: Primary | ICD-10-CM

## 2024-03-11 ENCOUNTER — OFFICE VISIT (OUTPATIENT)
Dept: PULMONOLOGY | Facility: CLINIC | Age: 74
End: 2024-03-11
Payer: MEDICARE

## 2024-03-11 DIAGNOSIS — J44.9: Primary | ICD-10-CM

## 2024-03-11 PROCEDURE — 99215 OFFICE O/P EST HI 40 MIN: CPT | Mod: GT | Performed by: INTERNAL MEDICINE

## 2024-03-11 PROCEDURE — 99215 OFFICE O/P EST HI 40 MIN: CPT | Performed by: INTERNAL MEDICINE

## 2024-03-11 NOTE — PATIENT INSTRUCTIONS
Thank you for allowing me to participate in your health care today. We will need to obtain more information from your cardiologist before we can decide about this procedure.     Please call [phone] ([facility] ) to get your tests scheduled.     Please call [phone] to schedule a follow up appointment with me.    Unless deemed urgent or emergent, the result(s) of any tests ordering during this clinic visit will be reviewed during your follow-up visit. Depending on the urgency of the result(s), I may call or send you a [employer] message.

## 2024-03-11 NOTE — ASSESSMENT & PLAN NOTE
- Has dyspnea with minimal exertion on maximal medical therapy, is enrolled in pulm rehab.   -We discussed the overall procedure bronchoscopic lung volume reduction an associated risk - including around a 30% rate of PTX development.   - PFTs 01/2024: FEV1\FVC 0.20, FVC 20% of predicted, no bronchodilator response, % of predicted, % predicted, DLCO corrected 62% of predicted  - 6MWT 01/2024: Walked 288 m, 59.7% of predicted, shlomo index 4, used 2 L/min of supplemental oxygen during ambulation  -   TTE 01/2024:   CONCLUSIONS:   1. Left ventricular systolic function is moderately decreased with a 35-40% estimated ejection fraction.   2. Poor endomyocardial wall border definition; recommend echo enhancing agent on future studies; there is hypokinesis of apical / lateral wall in addition to global LV hypokinesis; cannot exclude LV apical thrombus. Study limited by poor endomyocardial wall border definition.   3. Spectral Doppler shows an impaired relaxation pattern of left ventricular diastolic filling.   4. There is low normal right ventricular systolic function.  - CT chest 01/2024:   IMPRESSION:  1.  Extensive bronchial wall thickening and tree-in-bud nodularity  predominantly in the lower lobes, in keeping with airway disease with  active infection/bronchiolitis not excluded. Appearance of the lungs  appear grossly unchanged compared to prior CT from 05/23/2023.  2. Background emphysema with upper lung predominance.  3. Moderate coronary artery calcification. Correlation with coronary  artery disease risk factors.  4. Mild diffuse hepatic steatosis.  5. Other findings as described above  - StratX report:      Plan: Based on his pulmonary testing and imaging, the NOAH or LLL seem like ideal targets. However, I have questions regarding his cardiac fitness for GA. He recently saw a new cardiologist within the last 7-10 days. I'll request that office note, an assessment of shiv-operative risk, and to see if  further evaluation of the findings on his 01/2024 TTE are needed (apical thrombus)? His EF is estimated to be 35-40% and it is unclear if there is an apical thrombus based on the TTE report. We will re-assess once these issues have been addressed. If we are to proceed with BLVR, he is still pending a nuclear perfusion study and pre-operative lab work. Follow up visit once those questions have been addressed.

## 2024-03-11 NOTE — Clinical Note
See plan - need those things addressed from his new cardiologist - latest clinic note (says he saw him 7-10 days ago), shiv-op risk assessment, and if more work up is needed for this TTE in 01/2024 showing findings that could be an apical thrombus. Please make sure note is sent to his new pulmonologist as well.  Thanks.

## 2024-03-11 NOTE — PROGRESS NOTES
Department of Medicine  Division of Pulmonary, Critical Care, and Sleep Medicine  Consultation  [facility]    I was asked to evaluate Lyle Trujillo for bronchoscopic lung volume reduction. I have independently interviewed and examined the patient in the office and reviewed available records. The finalized note is available in the electronic medical record for review by the referring physician.     Due to the COVID-19 pandemic, this clinical encounter was conducted via virtual portal or telephone (if the patient did not have access to the virtual portal or declined to use the virtual portal). Verbal consent was obtained. Name and birthday were used to confirm the patient's identity. I confirmed with the patient that they were in a private environment in which they could freely discuss their healthcare information. Patient location: [place]. I communicated my name and role, and if a new patient, conveyed that I have an active [place] medical license.     74 y.o. male former smoker (quit in 2023, 90-pack-year history) with COPD (postbronchodilator FEV1 28% of predicted as of January 2024), SHARYN (on CPAP at bedtime), and HFrEF (with ICD place, latest EF is 35-40% in 02/2024) who has been referred to me for evaluation for bronchoscopic lung volume reduction. This is a follow up visit, he first saw me (virtually) in 01/2024.     Interval history: Feels his dyspnea is getting worse. Established care with a new cardiologists, Dr. Min Pope. He saw him in clinic within the last 7-10 days. Some changes were made to his medications, which including changing the beta-blocker to carvedilol, increasing the ARB dose, and starting dapagliflozin. No changes in his COPD medications, no AECOPD in the intermin. He has also established care with a new pulmonologist, Dr. Georgette Graham. Last note available for review from his cardiologist at the time of this encounter is a clinic note from 05/2023.     History from  previous visit(s):   Previously saw Dr. Block for pulmonary care and Dr. Aquino for cardiology.     Currently inhalers: albuterol PRN and Stiolto  Not on systemic prednisone  No other surgeries to chest wall or lungs, with the exception of a placement of a defibrillator  No history of PTX  No allergies to nickel, titanium, or nitinol  AECOPD last year : 0  PNA over the last year: 0  At present he is not interested in any surgical options for his COPD  mmRC: 3  CAT: 15   Allergies to nickel, titanium, or nitinol: No  Supplemental oxygen: PRN use  NIPPV: CPAP for SHARYN  Enrolled in pulmonary rehab: yes, currently    Immunization History:  Immunization History   Administered Date(s) Administered    Pfizer COVID-19 vaccine, Fall 2023, 12 years and older, (30mcg/0.3mL) 09/28/2023    Pfizer COVID-19 vaccine, bivalent, age 12 years and older (30 mcg/0.3 mL) 11/02/2022    Pfizer Gray Cap SARS-CoV-2 07/23/2022    Pfizer Purple Cap SARS-CoV-2 02/18/2021, 03/11/2021, 09/30/2021       Family History:  No family history on file.    Social History:  Social History     Socioeconomic History    Marital status: Unknown     Spouse name: Not on file    Number of children: Not on file    Years of education: Not on file    Highest education level: Not on file   Occupational History    Not on file   Tobacco Use    Smoking status: Not on file    Smokeless tobacco: Not on file   Substance and Sexual Activity    Alcohol use: Not on file    Drug use: Not on file    Sexual activity: Not on file   Other Topics Concern    Not on file   Social History Narrative    Not on file     Social Determinants of Health     Financial Resource Strain: Not on file   Food Insecurity: Not on file   Transportation Needs: Not on file   Physical Activity: Not on file   Stress: Not on file   Social Connections: Not on file   Intimate Partner Violence: Not on file   Housing Stability: Not on file       Current Medications:  No current outpatient medications     Drug  Allergies/Intolerances:  Not on File     Review of Systems:  Review of Systems     All other review of systems are negative and/or non-contributory.    Physical Examination:  Not applicable - telephone/virtual visit     Pulmonary Function Test Results     PFTs 2024: FEV1\FVC 0.20, FVC 20% of predicted, no bronchodilator response, % of predicted, % predicted, DLCO corrected 62% of predicted    6MWT 2024: Walked 288 m, 59.7% of predicted, shlomo index 4, used 2 L/min of supplemental oxygen during ambulation    Chest Radiograph     XR chest 1 view 2020    Narrative  Patient MRN:  [id number]  : 1950  Age: 70 years  Gender: Male    Order Date:  2020 1:00 AM        TECHNIQUE/NUMBER OF IMAGES/COMPARISON/CLINICAL HISTORY:    Chest AP    1 image one view.    Difficult breathing. History of COPD hypertension and hyperlipidemia.    FINDINGS: There is a mediastinal monitory device placed on the  left-sided. Monitory seen in the left infra axillary region.    Heart has normal size, lungs are clear, no infiltrates, consolidations  or pleural effusions.    There is no perihilar vascular congestion.    Impression  No acute cardiopulmonary process.      Echocardiogram     TTE 2024:   CONCLUSIONS:   1. Left ventricular systolic function is moderately decreased with a 35-40% estimated ejection fraction.   2. Poor endomyocardial wall border definition; recommend echo enhancing agent on future studies; there is hypokinesis of apical / lateral wall in addition to global LV hypokinesis; cannot exclude LV apical thrombus. Study limited by poor endomyocardial wall border definition.   3. Spectral Doppler shows an impaired relaxation pattern of left ventricular diastolic filling.   4. There is low normal right ventricular systolic function.       Chest CT Scan     CT chest 2024:   IMPRESSION:  1.  Extensive bronchial wall thickening and tree-in-bud nodularity  predominantly in the lower lobes, in  keeping with airway disease with  active infection/bronchiolitis not excluded. Appearance of the lungs  appear grossly unchanged compared to prior CT from 2023.  2. Background emphysema with upper lung predominance.  3. Moderate coronary artery calcification. Correlation with coronary  artery disease risk factors.  4. Mild diffuse hepatic steatosis.  5. Other findings as described above        Latest AB.42/73   Latest GFR: >60 (2023)      Assessment and Plan / Recommendations:  Problem List Items Addressed This Visit    74 y.o. male former smoker (quit in , 90-pack-year history) with COPD (postbronchodilator FEV1 28% of predicted as of 2024), SHARYN (on CPAP at bedtime), and HFrEF (with ICD place, latest EF is 35-40% in 2024) who has been referred to me for evaluation for bronchoscopic lung volume reduction. This is a follow up visit, he first saw me (virtually) in 2024.     Problem List Items Addressed This Visit          Pulmonary and Pneumonias    Very severe chronic obstructive pulmonary disease (CMS/HCC) - Primary     - Has dyspnea with minimal exertion on maximal medical therapy, is enrolled in pulm rehab.   -We discussed the overall procedure bronchoscopic lung volume reduction an associated risk - including around a 30% rate of PTX development.   - PFTs 2024: FEV1\FVC 0.20, FVC 20% of predicted, no bronchodilator response, % of predicted, % predicted, DLCO corrected 62% of predicted  - 6MWT 2024: Walked 288 m, 59.7% of predicted, shlomo index 4, used 2 L/min of supplemental oxygen during ambulation  -   TTE 2024:   CONCLUSIONS:   1. Left ventricular systolic function is moderately decreased with a 35-40% estimated ejection fraction.   2. Poor endomyocardial wall border definition; recommend echo enhancing agent on future studies; there is hypokinesis of apical / lateral wall in addition to global LV hypokinesis; cannot exclude LV apical thrombus. Study  limited by poor endomyocardial wall border definition.   3. Spectral Doppler shows an impaired relaxation pattern of left ventricular diastolic filling.   4. There is low normal right ventricular systolic function.  - CT chest 01/2024:   IMPRESSION:  1.  Extensive bronchial wall thickening and tree-in-bud nodularity  predominantly in the lower lobes, in keeping with airway disease with  active infection/bronchiolitis not excluded. Appearance of the lungs  appear grossly unchanged compared to prior CT from 05/23/2023.  2. Background emphysema with upper lung predominance.  3. Moderate coronary artery calcification. Correlation with coronary  artery disease risk factors.  4. Mild diffuse hepatic steatosis.  5. Other findings as described above  - StratX report:      Plan: Based on his pulmonary testing and imaging, the NOAH or LLL seem like ideal targets. However, I have questions regarding his cardiac fitness for GA. He recently saw a new cardiologist within the last 7-10 days. I'll request that office note, an assessment of shiv-operative risk, and to see if further evaluation of the findings on his 01/2024 TTE are needed (apical thrombus)? His EF is estimated to be 35-40% and it is unclear if there is an apical thrombus based on the TTE report. We will re-assess once these issues have been addressed. If we are to proceed with BLVR, he is still pending a nuclear perfusion study and pre-operative lab work. Follow up visit once those questions have been addressed.             Follow-up: 4-6 weeks    Time on telephone with patient (patient was offered both telephone and live A/V option, he elected the telephone): 26 minutes    Total time: 45 minutes    Parts of this note may have been generated using voice dictation software, Dragon.  Homophonic errors may exist.  Please contact me directly if clarification is needed.    Rodrigo Vidal MD  Staff Physician - Interventional Pulmonology  4:17 PM  03/11/24  Office  number: [phone]  Fax number:  [phone]

## 2024-03-18 ENCOUNTER — HOSPITAL ENCOUNTER (OUTPATIENT)
Dept: INFUSION THERAPY | Age: 74
Setting detail: INFUSION SERIES
Discharge: HOME | End: 2024-03-18
Payer: MEDICARE

## 2024-03-18 VITALS
OXYGEN SATURATION: 94 % | DIASTOLIC BLOOD PRESSURE: 69 MMHG | RESPIRATION RATE: 24 BRPM | TEMPERATURE: 97.9 F | SYSTOLIC BLOOD PRESSURE: 108 MMHG | HEART RATE: 99 BPM

## 2024-03-18 DIAGNOSIS — J45.50 SEVERE PERSISTENT ASTHMA, UNSPECIFIED WHETHER COMPLICATED: Primary | ICD-10-CM

## 2024-03-18 PROCEDURE — 96372 THER/PROPH/DIAG INJ SC/IM: CPT

## 2024-03-18 PROCEDURE — 6360000002 HC RX W HCPCS

## 2024-03-18 RX ADMIN — MEPOLIZUMAB 100 MG: 100 INJECTION, POWDER, FOR SOLUTION SUBCUTANEOUS at 11:31

## 2024-03-26 ENCOUNTER — TRANSCRIBE ORDERS (OUTPATIENT)
Dept: ADMINISTRATIVE | Age: 74
End: 2024-03-26

## 2024-03-26 DIAGNOSIS — R91.8 ABNORMAL CT SCAN OF LUNG: Primary | ICD-10-CM

## 2024-03-26 DIAGNOSIS — R91.8 LUNG NODULES: ICD-10-CM

## 2024-04-09 PROCEDURE — 93296 REM INTERROG EVL PM/IDS: CPT | Performed by: INTERNAL MEDICINE

## 2024-04-09 PROCEDURE — 93295 DEV INTERROG REMOTE 1/2/MLT: CPT | Performed by: INTERNAL MEDICINE

## 2024-04-15 ENCOUNTER — HOSPITAL ENCOUNTER (OUTPATIENT)
Dept: INFUSION THERAPY | Age: 74
Setting detail: INFUSION SERIES
Discharge: HOME | End: 2024-04-15
Payer: MEDICARE

## 2024-04-15 VITALS
SYSTOLIC BLOOD PRESSURE: 130 MMHG | OXYGEN SATURATION: 95 % | TEMPERATURE: 98 F | HEART RATE: 80 BPM | RESPIRATION RATE: 24 BRPM | DIASTOLIC BLOOD PRESSURE: 75 MMHG

## 2024-04-15 DIAGNOSIS — J45.50 SEVERE PERSISTENT ASTHMA, UNSPECIFIED WHETHER COMPLICATED: Primary | ICD-10-CM

## 2024-04-15 PROCEDURE — 96372 THER/PROPH/DIAG INJ SC/IM: CPT

## 2024-04-15 PROCEDURE — 6360000002 HC RX W HCPCS

## 2024-04-15 RX ADMIN — MEPOLIZUMAB 100 MG: 100 INJECTION, POWDER, FOR SOLUTION SUBCUTANEOUS at 10:22

## 2024-05-13 ENCOUNTER — HOSPITAL ENCOUNTER (OUTPATIENT)
Dept: INFUSION THERAPY | Age: 74
Setting detail: INFUSION SERIES
Discharge: HOME | End: 2024-05-13

## 2024-06-21 ENCOUNTER — OFFICE VISIT (OUTPATIENT)
Dept: NON INVASIVE DIAGNOSTICS | Age: 74
End: 2024-06-21

## 2024-06-21 ENCOUNTER — NURSE ONLY (OUTPATIENT)
Dept: NON INVASIVE DIAGNOSTICS | Age: 74
End: 2024-06-21

## 2024-06-21 VITALS
HEART RATE: 80 BPM | SYSTOLIC BLOOD PRESSURE: 110 MMHG | WEIGHT: 158 LBS | HEIGHT: 68 IN | RESPIRATION RATE: 16 BRPM | DIASTOLIC BLOOD PRESSURE: 66 MMHG | BODY MASS INDEX: 23.95 KG/M2

## 2024-06-21 DIAGNOSIS — I42.9 CARDIOMYOPATHY, UNSPECIFIED TYPE (HCC): Primary | ICD-10-CM

## 2024-06-21 DIAGNOSIS — Z95.810 PRESENCE OF AUTOMATIC CARDIOVERTER/DEFIBRILLATOR (AICD): ICD-10-CM

## 2024-06-21 RX ORDER — METOPROLOL SUCCINATE 25 MG/1
25 TABLET, EXTENDED RELEASE ORAL DAILY
COMMUNITY

## 2024-06-21 RX ORDER — LOSARTAN POTASSIUM 50 MG/1
1 TABLET ORAL 2 TIMES DAILY
COMMUNITY
Start: 2024-04-11

## 2024-06-21 RX ORDER — TAMSULOSIN HYDROCHLORIDE 0.4 MG/1
1 CAPSULE ORAL DAILY
COMMUNITY
Start: 2023-09-28

## 2024-06-21 RX ORDER — DAPAGLIFLOZIN 10 MG/1
10 TABLET, FILM COATED ORAL EVERY MORNING
COMMUNITY
Start: 2024-03-15

## 2024-06-21 RX ORDER — CARVEDILOL 25 MG/1
25 TABLET ORAL 2 TIMES DAILY
COMMUNITY
Start: 2024-04-11 | End: 2024-06-21 | Stop reason: CLARIF

## 2024-06-21 NOTE — PROGRESS NOTES
Cardiac Electrophysiology Outpatient Progress Note    Clarke Garcia  1950  Date of Service: 6/21/2024  Referring Provider/PCP: No, Pcp  Chief Complaint:   Chief Complaint   Patient presents with    Congestive Heart Failure     [facility]         Patient Active Problem List    Diagnosis Date Noted    Severe persistent asthma 03/06/2024     Overview Note:     This Diagnosis was added to the Problem List based on transcribed orders from Dr. 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    COPD (chronic obstructive pulmonary disease) ([facility]) 11/02/2023    Chronic obstructive pulmonary disease, unspecified ([facility]) 11/02/2023    Acute pharyngitis 11/02/2023    Benign prostatic hyperplasia 11/02/2023    Diabetes mellitus due to underlying condition with diabetic neuropathic arthropathy ([facility]) 11/02/2023    Diverticular disease of colon 11/02/2023     Overview Note:     Aug 20, 2012 Entered By: ERIKA JAMES Comment: 7/2/2012 colonoscopy-diverticulosis of sigmoid colon      Diabetic neuropathy ([facility]) 11/02/2023    Post-traumatic stress disorder, chronic 11/02/2023    Severe chronic obstructive pulmonary disease ([facility]) 04/17/2023    Cigarette smoker 04/17/2023    Chronic dyspnea 04/17/2023    Chronic cough 04/17/2023    Hypoxemia associated with sleep 04/17/2023    Chronic hypoxemic respiratory failure ([facility]) 02/26/2019     Overview Note:     2/2019 SaO2 84% RA at [facility]. On oxygen 2L/min NC continuously      Acute exacerbation of chronic obstructive airways disease ([facility]) 02/23/2019     Overview Note:     2/23/2019 Acute exacerbationm fo COPD - Pseudomonas aeruginosia in sputum [facility]      Chronic obstructive lung disease ([facility]) 04/13/2018     Overview Note:     Stage IV 4/2/2019      Bipolar disorder ([facility]) 04/13/2018     Overview Note:     2005      Congestive heart failure ([facility]) 04/13/2018     Overview Note:     2015 Systolic CHF (LVEF 28%) NYHA Functional Class IIIa      Sleep apnea 04/13/2018

## 2024-07-15 ENCOUNTER — CLINICAL DOCUMENTATION (OUTPATIENT)
Facility: HOSPITAL | Age: 74
End: 2024-07-15

## 2024-07-15 NOTE — PROGRESS NOTES
Returned patient call.  Patient is wondering why gaby hasn't paid yet, reached out to Davis to see where we are at in payment process.  Davis states he will follow up on payment today and get back to patient.  Verbalized understanding and forwarded information to patient.  FA is pending waiting on patient SS letter and patient states that he is waiting for this in the mail still.  Patient states that once he receives this he will call and set up an appt with me to forward information over to FA.

## 2024-09-13 ENCOUNTER — HOSPITAL ENCOUNTER (EMERGENCY)
Age: 74
Discharge: HOME | End: 2024-09-13
Payer: MEDICARE

## 2024-09-13 ENCOUNTER — APPOINTMENT (OUTPATIENT)
Dept: GENERAL RADIOLOGY | Age: 74
End: 2024-09-13
Payer: MEDICARE

## 2024-09-13 VITALS
OXYGEN SATURATION: 98 % | DIASTOLIC BLOOD PRESSURE: 71 MMHG | BODY MASS INDEX: 23.57 KG/M2 | SYSTOLIC BLOOD PRESSURE: 111 MMHG | HEART RATE: 100 BPM | TEMPERATURE: 98.3 F | WEIGHT: 155 LBS | RESPIRATION RATE: 20 BRPM

## 2024-09-13 DIAGNOSIS — M17.11 OSTEOARTHRITIS OF RIGHT KNEE, UNSPECIFIED OSTEOARTHRITIS TYPE: ICD-10-CM

## 2024-09-13 DIAGNOSIS — S83.91XA SPRAIN OF RIGHT KNEE, UNSPECIFIED LIGAMENT, INITIAL ENCOUNTER: Primary | ICD-10-CM

## 2024-09-13 PROCEDURE — 73560 X-RAY EXAM OF KNEE 1 OR 2: CPT

## 2024-09-13 PROCEDURE — 99211 OFF/OP EST MAY X REQ PHY/QHP: CPT

## 2024-09-13 ASSESSMENT — PAIN DESCRIPTION - LOCATION: LOCATION: KNEE

## 2024-09-13 ASSESSMENT — PAIN - FUNCTIONAL ASSESSMENT: PAIN_FUNCTIONAL_ASSESSMENT: 0-10

## 2024-09-13 ASSESSMENT — PAIN DESCRIPTION - ORIENTATION: ORIENTATION: RIGHT

## 2024-09-13 ASSESSMENT — PAIN SCALES - GENERAL: PAINLEVEL_OUTOF10: 9

## 2024-09-13 ASSESSMENT — PAIN DESCRIPTION - DESCRIPTORS: DESCRIPTORS: ACHING;DISCOMFORT

## 2024-09-16 SDOH — HEALTH STABILITY: PHYSICAL HEALTH: ON AVERAGE, HOW MANY DAYS PER WEEK DO YOU ENGAGE IN MODERATE TO STRENUOUS EXERCISE (LIKE A BRISK WALK)?: 0 DAYS

## 2024-09-18 ENCOUNTER — OFFICE VISIT (OUTPATIENT)
Dept: ORTHOPEDIC SURGERY | Age: 74
End: 2024-09-18
Payer: MEDICARE

## 2024-09-18 VITALS — HEIGHT: 68 IN | BODY MASS INDEX: 23.49 KG/M2 | WEIGHT: 155 LBS

## 2024-09-18 DIAGNOSIS — M25.561 ACUTE PAIN OF RIGHT KNEE: ICD-10-CM

## 2024-09-18 DIAGNOSIS — S83.281A ACUTE LATERAL MENISCUS TEAR OF RIGHT KNEE, INITIAL ENCOUNTER: Primary | ICD-10-CM

## 2024-09-18 DIAGNOSIS — M25.461 EFFUSION OF RIGHT KNEE: ICD-10-CM

## 2024-09-18 PROCEDURE — 1123F ACP DISCUSS/DSCN MKR DOCD: CPT | Performed by: ORTHOPAEDIC SURGERY

## 2024-09-18 PROCEDURE — 99203 OFFICE O/P NEW LOW 30 MIN: CPT | Performed by: ORTHOPAEDIC SURGERY

## 2024-09-18 ASSESSMENT — ENCOUNTER SYMPTOMS
EYE DISCHARGE: 0
SHORTNESS OF BREATH: 0
ABDOMINAL DISTENTION: 0
ALLERGIC/IMMUNOLOGIC NEGATIVE: 1

## 2024-10-07 ENCOUNTER — HOSPITAL ENCOUNTER (OUTPATIENT)
Dept: MRI IMAGING | Age: 74
Discharge: HOME | End: 2024-10-09
Attending: ORTHOPAEDIC SURGERY
Payer: MEDICARE

## 2024-10-07 DIAGNOSIS — M25.461 EFFUSION OF RIGHT KNEE: ICD-10-CM

## 2024-10-07 DIAGNOSIS — M25.561 ACUTE PAIN OF RIGHT KNEE: ICD-10-CM

## 2024-10-07 DIAGNOSIS — S83.281A ACUTE LATERAL MENISCUS TEAR OF RIGHT KNEE, INITIAL ENCOUNTER: ICD-10-CM

## 2024-10-07 PROCEDURE — 73721 MRI JNT OF LWR EXTRE W/O DYE: CPT

## 2024-11-21 PROCEDURE — 88305 TISSUE EXAM BY PATHOLOGIST: CPT | Performed by: DERMATOLOGY

## 2024-11-22 ENCOUNTER — LAB REQUISITION (OUTPATIENT)
Dept: DERMATOPATHOLOGY | Facility: CLINIC | Age: 74
End: 2024-11-22
Payer: MEDICARE

## 2024-11-22 DIAGNOSIS — L98.9 DISORDER OF THE SKIN AND SUBCUTANEOUS TISSUE, UNSPECIFIED: ICD-10-CM

## 2024-11-25 LAB
LABORATORY COMMENT REPORT: NORMAL
PATH REPORT.FINAL DX SPEC: NORMAL
PATH REPORT.GROSS SPEC: NORMAL
PATH REPORT.RELEVANT HX SPEC: NORMAL
PATH REPORT.TOTAL CANCER: NORMAL

## 2024-11-27 ENCOUNTER — OFFICE VISIT (OUTPATIENT)
Dept: SURGERY | Age: 74
End: 2024-11-27
Payer: MEDICARE

## 2024-11-27 VITALS
SYSTOLIC BLOOD PRESSURE: 105 MMHG | BODY MASS INDEX: 24.11 KG/M2 | HEART RATE: 94 BPM | TEMPERATURE: 96.8 F | HEIGHT: 66 IN | DIASTOLIC BLOOD PRESSURE: 65 MMHG | WEIGHT: 150 LBS

## 2024-11-27 DIAGNOSIS — C44.722 SQUAMOUS CELL CARCINOMA OF SKIN OF RIGHT LOWER EXTREMITY, INCLUDING HIP: Primary | ICD-10-CM

## 2024-11-27 PROCEDURE — 3078F DIAST BP <80 MM HG: CPT | Performed by: PLASTIC SURGERY

## 2024-11-27 PROCEDURE — 3074F SYST BP LT 130 MM HG: CPT | Performed by: PLASTIC SURGERY

## 2024-11-27 PROCEDURE — 99204 OFFICE O/P NEW MOD 45 MIN: CPT | Performed by: PLASTIC SURGERY

## 2024-11-27 PROCEDURE — 1123F ACP DISCUSS/DSCN MKR DOCD: CPT | Performed by: PLASTIC SURGERY

## 2024-11-27 NOTE — PROGRESS NOTES
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  The patient states that they first noticed the lesion 3 months ago.  It has grown in size since they first noticed the lesion.  The lesion has not changed in color and has not had discharge or bleeding.  The pt has had the lesion biopsied previously.  The patient has not had the lesion removed previously. The patient states the lesion is not painfull.  The pt denies any associated symptoms.  He has extensive history of skin cancers removed from other areas. Current smoker. Denies any blood thinner use.    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  Allergies:  Patient has no known allergies.    Social History:   Social History     Socioeconomic History    Marital status:      Spouse name: Not on file    Number of children: Not on file    Years of education: Not on file    Highest education level: Not on file   Occupational History    Not on file   Tobacco Use    Smoking status: Some Days     Current packs/day: 0.00     Types: Cigarettes     Last attempt to quit: 2023     Years since quittin.4

## 2024-12-03 ENCOUNTER — TELEPHONE (OUTPATIENT)
Dept: SURGERY | Age: 74
End: 2024-12-03

## 2024-12-03 NOTE — TELEPHONE ENCOUNTER
Patient was informed to call the pcp and ask for a medical clearance once clearance received we can then schedule surgery    We have a date for surgery 12/17/2024 available to schedule

## 2024-12-10 ENCOUNTER — PREP FOR PROCEDURE (OUTPATIENT)
Dept: SURGERY | Age: 74
End: 2024-12-10

## 2024-12-10 PROBLEM — C44.721 SCC (SQUAMOUS CELL CARCINOMA), LEG: Status: ACTIVE | Noted: 2024-12-10

## 2024-12-11 NOTE — H&P
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  The patient states that they first noticed the lesion 3 months ago.  It has grown in size since they first noticed the lesion.  The lesion has not changed in color and has not had discharge or bleeding.  The pt has had the lesion biopsied previously.  The patient has not had the lesion removed previously. The patient states the lesion is not painfull.  The pt denies any associated symptoms.  He has extensive history of skin cancers removed from other areas. Current smoker. Denies any blood thinner use.     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     Allergies:  Patient has no known allergies.     Social History:   Social History               Socioeconomic History    Marital status:        Spouse name: Not on file    Number of children: Not on file    Years of education: Not on file    Highest education level: Not on file   Occupational History    Not on file   Tobacco Use    Smoking status:  the operating room with possible full thickness skin graft, possible local tissue rearrangement.  -The patient will not require frozen sections in the operating room  -The patient will require pre-op clearance from their PCP.     The risks, benefits and options were discussed with the pt. The risks included but not limited to pain, bleeding, infection, heavy scarring, damage to surrounding structures, fluid collections, asymmetry, deep vein thrombosis, pulmonary embolism, wounding, death and need for further procedures. All of His questions were answered to her satisfaction and He agrees to proceed with the operation.      Attestation    No change in patient's H & P. I have reviewed the procedure with the patient as well as the risk and benefits. They have no further questions and agree to proceed with surgery.    Height 1.676 m (5' 6\"), weight 65.8 kg (145 lb).      Justino Desir MD   7:43 AM  12/17/2024

## 2024-12-13 NOTE — PROGRESS NOTES
[facility]   PRE-ADMISSION TESTING GENERAL INSTRUCTIONS  PAT Phone Number: [phone]      GENERAL INSTRUCTIONS:    [x] Antibacterial Soap Shower Night before AND the Morning of procedure.  [x] Do not wear lotions, powders, deodorant the morning of surgery.  [x] No solid food after midnight. You may have SIPS of clear liquids up until 2 hours before your arrival time to the hospital.   [x] You may brush your teeth, gargle, but do not swallow water.   [x] No tobacco products, illegal drugs, or alcohol within 24 hours of your surgery.  [x] Jewelry or valuables should not be brought to the hospital. All body and/or tongue piercing's must be removed prior to arriving to hospital. No contact lens or hair pins.   [x] Arrange transportation with a responsible adult  to and from the hospital. Arrange for someone to be with you for the remainder of the day and for 24 hours after your procedure due to having had anesthesia.          -Who will be your  for transportation? Daughter         -Who will be staying with you for 24 hrs after your procedure? Daughter   [x] Bring insurance card and photo ID.  [] Bring copy of living will or healthcare power of  papers to be placed in your electronic record.  [] Urine Pregnancy test will be preformed the day of surgery. A specimen sample may be brought from home.  [] Transfusion (Green) Bracelet: Please bring with you to hospital, day of surgery.     PARKING INSTRUCTIONS:     [x] ARRIVAL DATE & TIME:  12/17  @  0730  [x] Times are subject to change. We will contact you the business day before surgery if that were to occur.  [x] Enter into the [facility] Entrance. Two people may accompany you. Masks are not required.  [x] Parking Lot \"I\" is where you will park. It is located on the corner of [address] and [address]. The entrance is on [address].   Only one vehicle - per patient, is permitted in parking lot.   Upon  variables that are involved in patient preparation. Your patience is greatly appreciated as you wait for your nurse.   [x] Delays may occur with surgery and staff will make a sincere effort to keep you informed of delays. If any delays occur with your procedure, we apologize ahead of time for your inconvenience as we recognize the value of your time.

## 2024-12-17 ENCOUNTER — ANESTHESIA EVENT (OUTPATIENT)
Dept: OPERATING ROOM | Age: 74
End: 2024-12-17
Payer: MEDICARE

## 2024-12-17 ENCOUNTER — ANESTHESIA (OUTPATIENT)
Dept: OPERATING ROOM | Age: 74
End: 2024-12-17
Payer: MEDICARE

## 2024-12-17 ENCOUNTER — HOSPITAL ENCOUNTER (OUTPATIENT)
Age: 74
Setting detail: OUTPATIENT SURGERY
Discharge: HOME | End: 2024-12-17
Attending: PLASTIC SURGERY | Admitting: PLASTIC SURGERY
Payer: MEDICARE

## 2024-12-17 VITALS
DIASTOLIC BLOOD PRESSURE: 72 MMHG | HEART RATE: 80 BPM | HEIGHT: 66 IN | TEMPERATURE: 97.1 F | BODY MASS INDEX: 23.3 KG/M2 | RESPIRATION RATE: 16 BRPM | SYSTOLIC BLOOD PRESSURE: 122 MMHG | OXYGEN SATURATION: 91 % | WEIGHT: 145 LBS

## 2024-12-17 DIAGNOSIS — G89.18 POST-OP PAIN: ICD-10-CM

## 2024-12-17 DIAGNOSIS — C44.721: ICD-10-CM

## 2024-12-17 DIAGNOSIS — Z01.812 PRE-OPERATIVE LABORATORY EXAMINATION: Primary | ICD-10-CM

## 2024-12-17 LAB — GLUCOSE BLD-MCNC: 121 MG/DL (ref 74–99)

## 2024-12-17 PROCEDURE — 3700000001 HC ADD 15 MINUTES (ANESTHESIA): Performed by: PLASTIC SURGERY

## 2024-12-17 PROCEDURE — 6370000000 HC RX 637 (ALT 250 FOR IP): Performed by: PLASTIC SURGERY

## 2024-12-17 PROCEDURE — 7100000011 HC PHASE II RECOVERY - ADDTL 15 MIN: Performed by: PLASTIC SURGERY

## 2024-12-17 PROCEDURE — 2580000003 HC RX 258: Performed by: PLASTIC SURGERY

## 2024-12-17 PROCEDURE — 6360000002 HC RX W HCPCS: Performed by: PLASTIC SURGERY

## 2024-12-17 PROCEDURE — 88305 TISSUE EXAM BY PATHOLOGIST: CPT

## 2024-12-17 PROCEDURE — 6360000002 HC RX W HCPCS

## 2024-12-17 PROCEDURE — 3700000000 HC ANESTHESIA ATTENDED CARE: Performed by: PLASTIC SURGERY

## 2024-12-17 PROCEDURE — 3600000002 HC SURGERY LEVEL 2 BASE: Performed by: PLASTIC SURGERY

## 2024-12-17 PROCEDURE — 2709999900 HC NON-CHARGEABLE SUPPLY: Performed by: PLASTIC SURGERY

## 2024-12-17 PROCEDURE — 82962 GLUCOSE BLOOD TEST: CPT

## 2024-12-17 PROCEDURE — 3600000012 HC SURGERY LEVEL 2 ADDTL 15MIN: Performed by: PLASTIC SURGERY

## 2024-12-17 PROCEDURE — 6370000000 HC RX 637 (ALT 250 FOR IP): Performed by: ANESTHESIOLOGY

## 2024-12-17 PROCEDURE — 7100000010 HC PHASE II RECOVERY - FIRST 15 MIN: Performed by: PLASTIC SURGERY

## 2024-12-17 RX ORDER — SODIUM CHLORIDE 0.9 % (FLUSH) 0.9 %
5-40 SYRINGE (ML) INJECTION EVERY 12 HOURS SCHEDULED
Status: DISCONTINUED | OUTPATIENT
Start: 2024-12-17 | End: 2024-12-17 | Stop reason: HOSPADM

## 2024-12-17 RX ORDER — SODIUM CHLORIDE 0.9 % (FLUSH) 0.9 %
5-40 SYRINGE (ML) INJECTION PRN
Status: DISCONTINUED | OUTPATIENT
Start: 2024-12-17 | End: 2024-12-17 | Stop reason: HOSPADM

## 2024-12-17 RX ORDER — LIDOCAINE HYDROCHLORIDE AND EPINEPHRINE 10; 10 MG/ML; UG/ML
INJECTION, SOLUTION INFILTRATION; PERINEURAL PRN
Status: DISCONTINUED | OUTPATIENT
Start: 2024-12-17 | End: 2024-12-17 | Stop reason: ALTCHOICE

## 2024-12-17 RX ORDER — TRAMADOL HYDROCHLORIDE 50 MG/1
50 TABLET ORAL PRN
Status: DISCONTINUED | OUTPATIENT
Start: 2024-12-17 | End: 2024-12-17 | Stop reason: HOSPADM

## 2024-12-17 RX ORDER — SODIUM CHLORIDE 9 MG/ML
INJECTION, SOLUTION INTRAVENOUS PRN
Status: DISCONTINUED | OUTPATIENT
Start: 2024-12-17 | End: 2024-12-17 | Stop reason: HOSPADM

## 2024-12-17 RX ORDER — BACITRACIN ZINC 500 [USP'U]/G
OINTMENT TOPICAL PRN
Status: DISCONTINUED | OUTPATIENT
Start: 2024-12-17 | End: 2024-12-17 | Stop reason: ALTCHOICE

## 2024-12-17 RX ORDER — GINSENG 100 MG
CAPSULE ORAL
Qty: 14 G | Refills: 1 | Status: SHIPPED | OUTPATIENT
Start: 2024-12-17

## 2024-12-17 RX ORDER — METOPROLOL SUCCINATE 50 MG/1
25 TABLET, EXTENDED RELEASE ORAL ONCE
Status: COMPLETED | OUTPATIENT
Start: 2024-12-17 | End: 2024-12-17

## 2024-12-17 RX ORDER — PROPOFOL 10 MG/ML
INJECTION, EMULSION INTRAVENOUS
Status: DISCONTINUED | OUTPATIENT
Start: 2024-12-17 | End: 2024-12-17 | Stop reason: SDUPTHER

## 2024-12-17 RX ORDER — HYDROCODONE BITARTRATE AND ACETAMINOPHEN 5; 325 MG/1; MG/1
1 TABLET ORAL EVERY 4 HOURS PRN
Qty: 10 TABLET | Refills: 0 | Status: SHIPPED | OUTPATIENT
Start: 2024-12-17 | End: 2024-12-24

## 2024-12-17 RX ORDER — SODIUM CHLORIDE 9 MG/ML
INJECTION, SOLUTION INTRAVENOUS CONTINUOUS
Status: DISCONTINUED | OUTPATIENT
Start: 2024-12-17 | End: 2024-12-17 | Stop reason: HOSPADM

## 2024-12-17 RX ORDER — NALOXONE HYDROCHLORIDE 0.4 MG/ML
INJECTION, SOLUTION INTRAMUSCULAR; INTRAVENOUS; SUBCUTANEOUS PRN
Status: DISCONTINUED | OUTPATIENT
Start: 2024-12-17 | End: 2024-12-17 | Stop reason: HOSPADM

## 2024-12-17 RX ORDER — TRAMADOL HYDROCHLORIDE 50 MG/1
100 TABLET ORAL PRN
Status: DISCONTINUED | OUTPATIENT
Start: 2024-12-17 | End: 2024-12-17 | Stop reason: HOSPADM

## 2024-12-17 RX ORDER — FENTANYL CITRATE 50 UG/ML
INJECTION, SOLUTION INTRAMUSCULAR; INTRAVENOUS
Status: DISCONTINUED | OUTPATIENT
Start: 2024-12-17 | End: 2024-12-17 | Stop reason: SDUPTHER

## 2024-12-17 RX ORDER — CEPHALEXIN 500 MG/1
500 CAPSULE ORAL 4 TIMES DAILY
Qty: 20 CAPSULE | Refills: 0 | Status: SHIPPED | OUTPATIENT
Start: 2024-12-17 | End: 2024-12-22

## 2024-12-17 RX ADMIN — METOPROLOL SUCCINATE 25 MG: 50 TABLET, EXTENDED RELEASE ORAL at 08:17

## 2024-12-17 RX ADMIN — PROPOFOL 50 MCG/KG/MIN: 10 INJECTION, EMULSION INTRAVENOUS at 10:11

## 2024-12-17 RX ADMIN — CEFAZOLIN 2000 MG: 2 INJECTION, POWDER, FOR SOLUTION INTRAMUSCULAR; INTRAVENOUS at 10:11

## 2024-12-17 RX ADMIN — FENTANYL CITRATE 50 MCG: 50 INJECTION, SOLUTION INTRAMUSCULAR; INTRAVENOUS at 10:11

## 2024-12-17 RX ADMIN — SODIUM CHLORIDE: 9 INJECTION, SOLUTION INTRAVENOUS at 08:12

## 2024-12-17 ASSESSMENT — ENCOUNTER SYMPTOMS: SHORTNESS OF BREATH: 1

## 2024-12-17 ASSESSMENT — PAIN - FUNCTIONAL ASSESSMENT
PAIN_FUNCTIONAL_ASSESSMENT: 0-10
PAIN_FUNCTIONAL_ASSESSMENT: NONE - DENIES PAIN

## 2024-12-17 ASSESSMENT — COPD QUESTIONNAIRES: CAT_SEVERITY: SEVERE

## 2024-12-17 ASSESSMENT — LIFESTYLE VARIABLES: SMOKING_STATUS: 1

## 2024-12-17 NOTE — ANESTHESIA POSTPROCEDURE EVALUATION
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    Yash Phase I: Yash Score: 10    Yash Phase II: Yash Score: 8    Anesthesia Post Evaluation    Patient location during evaluation: PACU  Patient participation: complete - patient participated  Level of consciousness: awake and alert  Airway patency: patent  Nausea & Vomiting: no nausea and no vomiting  Cardiovascular status: hemodynamically stable  Respiratory status: acceptable  Hydration status: euvolemic  Multimodal analgesia pain management approach  Pain management: adequate    No notable events documented.

## 2024-12-17 NOTE — DISCHARGE INSTRUCTIONS
Discharge Home.   Call office to schedule a follow-up appointment at [phone]  Please call to schedule an appointment to be seen In our office on ***   Diet: regular diet  Activity: ambulate in house and no Strenuous exercise for 1 week  Shower/Bathing:  You can shower in 48 hours over the incision site.  At that time you can allow soap and water to rinse off the incision site while showering.  Once you are done in the shower you can pat dry the incision site with a clean paper towel.  No baths, hot tubs or soaking of the wound site at this time.   Dressings /Splint /Wound Care:Keep wound clean and dry.  Apply bacitracin *** to the wound site two times daily.  Driving: It is OK to drive if the following criteria are met:   1- Not taking narcotic pain medication   2- Not distracted by pain or limited ROM   3- Not a hazard to self or others on the road  Medications: As prescribed.  Educated to contact physician at [phone] with concerns or signs of infection (redness, increasing pain, discharge, odor, fever).

## 2024-12-17 NOTE — OP NOTE
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    PROCEDURES PERFORMED:  Excision of right shin squamous cell carcinoma with complex closure.  Lesion measures 10 x 7 mm, margins were 4 mm, defect measured 18 x 15 mm, and final scar length is 55 mm.    ASSISTANT:  Resident, Magen Sawant DO.    ANESTHESIA:  Monitored anesthesia care.    ESTIMATED BLOOD LOSS:  2 mL.    IV FLUIDS:  400 mL.    COMPLICATIONS:  None.    FINDINGS:  Squamous cell carcinoma of the right lateral shin.    INDICATIONS FOR PROCEDURE:  This is a 74-year-old male with biopsy-proven squamous cell carcinoma of the right lateral shin.  Recommendation was for wide local excision with complex closure versus possible full-thickness skin graft.  Risks, benefits, and alternatives were thoroughly discussed with him including but not limited to risk of bleeding, infection, scarring, damage to surrounding structures, fluid collection, asymmetry, and need for further procedures.  He verbalized understanding and agreed to proceed.    DESCRIPTION OF PROCEDURE:  The patient was identified preoperatively, brought back to the operating room and placed supine on the bed.  He was turned over to Anesthesia for proper induction via monitored anesthesia care.  A proper time-out was performed.  SCDs were on and functioning.  Preoperative antibiotics were administered.  He was then prepped and draped in the usual sterile fashion.

## 2024-12-17 NOTE — ANESTHESIA PRE PROCEDURE
Department of Anesthesiology  Preprocedure Note       Name:  Clarke Garcia   Age:  74 y.o.  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 Authorizing Provider   Venlafaxine HCl (EFFEXOR PO) Take 75 mg by mouth 2 times daily   Yes Moe Mccarthy MD   Gabapentin (NEURONTIN PO) Take 400 mg by mouth 4 times daily   Yes Moe Mccarthy MD   Sacubitril-Valsartan (ENTRESTO PO) Take by mouth Bid not sure of neris per patient    Moe Mccarthy MD   FARXIGA 10 MG tablet Take 1 tablet by mouth every morning 3/15/24   Moe Mccarthy MD   metFORMIN (GLUCOPHAGE) 500 MG tablet Take 1 tablet by mouth 2 times daily (with meals) 19   Moe Mccarthy MD   tamsulosin (FLOMAX) 0.4 MG capsule Take 1 capsule by mouth daily 23   Moe Mccarthy MD   Coenzyme Q10 (CO Q 10 PO) Take by mouth daily    Moe Mccarthy MD   Cyanocobalamin (B-12) 1000 MCG CAPS Take by mouth    Moe Mccatrhy MD   metoprolol succinate (TOPROL XL) 25 MG extended release tablet Take 1 tablet by mouth daily    Moe Mccarthy MD   atorvastatin (LIPITOR) 80 MG tablet Take 1 tablet by mouth daily 22   Moe Mccarthy MD   tiotropium-olodaterol (STIOLTO) 2.5-2.5 MCG/ACT AERS Inhale 2 puffs into the lungs daily    Moe Mccarthy MD   vitamin D3 (CHOLECALCIFEROL) 125 MCG (5000 UT) TABS tablet Take 1 tablet every day by oral route.    Moe Mccarthy MD   Multiple Vitamin (MULTI-VITAMIN DAILY) TABS Take by mouth Once daily    Moe Mccarthy MD   albuterol (PROVENTIL) (2.5 MG/3ML) 0.083% nebulizer solution Take 3 mLs by nebulization every 6

## 2024-12-19 LAB — SURGICAL PATHOLOGY REPORT: NORMAL

## 2024-12-30 ENCOUNTER — OFFICE VISIT (OUTPATIENT)
Dept: SURGERY | Age: 74
End: 2024-12-30

## 2024-12-30 VITALS
OXYGEN SATURATION: 92 % | SYSTOLIC BLOOD PRESSURE: 122 MMHG | HEART RATE: 94 BPM | TEMPERATURE: 97.2 F | DIASTOLIC BLOOD PRESSURE: 67 MMHG

## 2024-12-30 DIAGNOSIS — C44.722 SQUAMOUS CELL CARCINOMA OF SKIN OF RIGHT LOWER EXTREMITY, INCLUDING HIP: Primary | ICD-10-CM

## 2024-12-30 PROCEDURE — 99024 POSTOP FOLLOW-UP VISIT: CPT | Performed by: PHYSICIAN ASSISTANT

## 2024-12-30 NOTE — PROGRESS NOTES
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 Denies fever, nausea, vomiting, leg pain or swelling, pain is absent.  The pt states that they have  kept the wound site(s) covered as directed.    They state that their pain is absent.     Objective:    /67 (Site: Left Upper Arm, Position: Sitting, Cuff Size: Medium Adult)   Pulse 94   Temp 97.2 °F (36.2 °C) (Temporal)   SpO2 92%       Wound: Clean, dry, and intact, no drainage.  No signs of infection, wound healing well. Sutures intact  Neuro- Sensation  intact to librado incisional site with light touch .      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)    Coronary arteriosclerosis    Congestive heart failure (HCC)    Cigarette smoker    Chronic hypoxemic respiratory failure    Chronic dyspnea    Chronic cough    Hypoxemia associated with sleep    Hypertension    Hypercholesterolemia    Diverticular disease of colon    Diabetic neuropathy (HCC)    Sleep apnea    Recurrent major depression (HCC)    Post-traumatic stress disorder, chronic    Severe persistent asthma    SCC (squamous cell carcinoma), leg    Pre-operative laboratory examination       Labs: CBC:   Lab Results   Component Value Date/Time    WBC 13.1 05/11/2020 01:02 AM    RBC 5.31 05/11/2020 01:02 AM    HGB 15.4 05/11/2020 01:02 AM    HCT 45.7 05/11/2020 01:02 AM    MCV 86.1 05/11/2020 01:02 AM    MCH 29.0 05/11/2020

## 2025-01-16 PROBLEM — Z01.812 PRE-OPERATIVE LABORATORY EXAMINATION: Status: RESOLVED | Noted: 2024-12-17 | Resolved: 2025-01-16

## 2025-01-27 ENCOUNTER — OFFICE VISIT (OUTPATIENT)
Dept: SURGERY | Age: 75
End: 2025-01-27

## 2025-01-27 VITALS
HEART RATE: 85 BPM | TEMPERATURE: 97.3 F | DIASTOLIC BLOOD PRESSURE: 67 MMHG | OXYGEN SATURATION: 94 % | SYSTOLIC BLOOD PRESSURE: 110 MMHG

## 2025-01-27 DIAGNOSIS — C44.722 SQUAMOUS CELL CARCINOMA OF SKIN OF RIGHT LOWER EXTREMITY, INCLUDING HIP: Primary | ICD-10-CM

## 2025-01-27 PROCEDURE — 99024 POSTOP FOLLOW-UP VISIT: CPT | Performed by: PHYSICIAN ASSISTANT

## 2025-01-27 RX ORDER — CEPHALEXIN 500 MG/1
500 CAPSULE ORAL 4 TIMES DAILY
Qty: 20 CAPSULE | Refills: 0 | Status: SHIPPED | OUTPATIENT
Start: 2025-01-27 | End: 2025-02-01

## 2025-01-27 NOTE — PROGRESS NOTES
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 Denies fever, nausea, vomiting, leg pain or swelling, pain is absent.  He presents her office today with concerns of wounding to the right lower leg incision site.  He states that he has been wearing bacitracin ointment over the incision line since the day of surgery daily    Objective:    /67 (Site: Right Upper Arm, Position: Sitting, Cuff Size: Medium Adult)   Pulse 85   Temp 97.3 °F (36.3 °C) (Temporal)   SpO2 94%       Right lower leg wound incision healing well with some spitting Vicryl sutures there is an erythematous hue circumferential to the wound site.  No signs of cellulitis.      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)    Coronary arteriosclerosis    Congestive heart failure (HCC)    Cigarette smoker    Chronic hypoxemic respiratory failure    Chronic dyspnea    Chronic cough    Hypoxemia associated with sleep    Hypertension    Hypercholesterolemia    Diverticular disease of colon    Diabetic neuropathy (HCC)    Sleep apnea    Recurrent major depression (HCC)    Post-traumatic stress disorder, chronic    Severe persistent asthma    SCC (squamous cell carcinoma), leg       Labs: CBC:   Lab Results   Component Value Date/Time    WBC 13.1 05/11/2020 01:02 AM    RBC 5.31 05/11/2020 01:02 AM    HGB 15.4 05/11/2020 01:02 AM    HCT 45.7 05/11/2020 01:02 AM    MCV 86.1

## 2025-02-10 ENCOUNTER — OFFICE VISIT (OUTPATIENT)
Dept: SURGERY | Age: 75
End: 2025-02-10

## 2025-02-10 VITALS
HEART RATE: 92 BPM | DIASTOLIC BLOOD PRESSURE: 75 MMHG | TEMPERATURE: 97.2 F | OXYGEN SATURATION: 97 % | SYSTOLIC BLOOD PRESSURE: 130 MMHG

## 2025-02-10 DIAGNOSIS — C44.722 SQUAMOUS CELL CARCINOMA OF SKIN OF RIGHT LOWER EXTREMITY, INCLUDING HIP: Primary | ICD-10-CM

## 2025-02-10 PROCEDURE — 99024 POSTOP FOLLOW-UP VISIT: CPT | Performed by: PHYSICIAN ASSISTANT

## 2025-02-10 NOTE — PROGRESS NOTES
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 Denies fever, nausea, vomiting, leg pain or swelling, pain is absent.  He presents today for continued observation.  He states he has completed his prophylactic antibiotics and voices no complaints at this time.  Objective:    /75 (Site: Left Upper Arm, Position: Sitting, Cuff Size: Medium Adult)   Pulse 92   Temp 97.2 °F (36.2 °C) (Temporal)   SpO2 97%       Right lower leg wound incision healing well with 2 superficial scabs.  After palpation of the scabs with a 4 x 4 gauze these were superficially debrided and healthy granulation tissue was noted without any tracking or undermining no signs of infection    Assessment:    Patient Active Problem List   Diagnosis    Diabetes 1.5, managed as type 2 (HCC)    COPD (chronic obstructive pulmonary disease) (HCC)    Chronic obstructive pulmonary disease, unspecified (HCC)    Chronic obstructive lung disease (HCC)    Acute pharyngitis    Acute exacerbation of chronic obstructive airways disease ([facility])    Severe chronic obstructive pulmonary disease (HCC)    Benign prostatic hyperplasia    Bipolar disorder (HCC)    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)    Coronary arteriosclerosis    Congestive heart failure (HCC)    Cigarette smoker    Chronic hypoxemic respiratory failure    Chronic dyspnea    Chronic cough    Hypoxemia associated with sleep    Hypertension    Hypercholesterolemia    Diverticular disease of colon    Diabetic neuropathy (HCC)    Sleep apnea    Recurrent major depression (HCC)    Post-traumatic stress disorder, chronic    Severe persistent asthma    SCC (squamous cell carcinoma), leg       Labs: CBC:   Lab Results   Component Value Date/Time    WBC 13.1 05/11/2020 01:02 AM    RBC 5.31 05/11/2020 01:02 AM    HGB 15.4 05/11/2020 01:02 AM    HCT 45.7 05/11/2020 01:02

## 2025-02-20 PROCEDURE — 88305 TISSUE EXAM BY PATHOLOGIST: CPT | Performed by: DERMATOLOGY

## 2025-02-21 ENCOUNTER — LAB REQUISITION (OUTPATIENT)
Dept: DERMATOPATHOLOGY | Facility: CLINIC | Age: 75
End: 2025-02-21
Payer: MEDICARE

## 2025-02-21 DIAGNOSIS — L98.9 DISORDER OF THE SKIN AND SUBCUTANEOUS TISSUE, UNSPECIFIED: ICD-10-CM

## 2025-08-12 ENCOUNTER — TRANSCRIBE ORDERS (OUTPATIENT)
Dept: ADMINISTRATIVE | Age: 75
End: 2025-08-12

## 2025-08-12 DIAGNOSIS — R19.7 DIARRHEA, UNSPECIFIED TYPE: Primary | ICD-10-CM

## (undated) DEVICE — BLADE,STAINLESS-STEEL,10,STRL,DISPOSABLE: Brand: MEDLINE

## (undated) DEVICE — PAD,ABDOMINAL,5"X9",ST,LF,25/BX: Brand: MEDLINE INDUSTRIES, INC.

## (undated) DEVICE — WIPES SKIN CLOTH READYPREP 9 X 10.5 IN 2% CHLORHEX GLUCONATE CHG PREOP

## (undated) DEVICE — LIQUIBAND RAPID ADHESIVE 36/CS 0.8ML: Brand: MEDLINE

## (undated) DEVICE — STRAP POS MP 30X3 IN HK LOOP CLOSURE FOAM DISP

## (undated) DEVICE — ELECTRODE PT RET AD L9FT HI MOIST COND ADH HYDRGEL CORDED

## (undated) DEVICE — TOWEL,OR,DSP,ST,BLUE,STD,6/PK,12PK/CS: Brand: MEDLINE

## (undated) DEVICE — UNIVERSAL DRAPE: Brand: MEDLINE INDUSTRIES, INC.

## (undated) DEVICE — SYRINGE MED 10ML TRNSLUC BRL PLUNG BLK MRK POLYPR CTRL

## (undated) DEVICE — TUBING SUCT 12FR MAL ALUM SHFT FN CAP VENT UNIV CONN W/ OBT

## (undated) DEVICE — BLADE,STAINLESS-STEEL,15,STRL,DISPOSABLE: Brand: MEDLINE

## (undated) DEVICE — NEEDLE HYPO 27GA L15IN REG BVL W O SFTY FOR SYR DISPOSABLE

## (undated) DEVICE — Device

## (undated) DEVICE — STERILE POLYISOPRENE POWDER-FREE SURGICAL GLOVES: Brand: PROTEXIS

## (undated) DEVICE — BANDAGE GAUZE 4.5INX4.1YD STERILE LF PRM25865P